# Patient Record
Sex: FEMALE | Race: ASIAN | NOT HISPANIC OR LATINO
[De-identification: names, ages, dates, MRNs, and addresses within clinical notes are randomized per-mention and may not be internally consistent; named-entity substitution may affect disease eponyms.]

---

## 2018-11-28 ENCOUNTER — TRANSCRIPTION ENCOUNTER (OUTPATIENT)
Age: 28
End: 2018-11-28

## 2018-11-28 ENCOUNTER — APPOINTMENT (OUTPATIENT)
Dept: INTERNAL MEDICINE | Facility: CLINIC | Age: 28
End: 2018-11-28
Payer: COMMERCIAL

## 2018-11-28 VITALS
TEMPERATURE: 98.4 F | OXYGEN SATURATION: 97 % | BODY MASS INDEX: 22.58 KG/M2 | HEART RATE: 72 BPM | HEIGHT: 59 IN | RESPIRATION RATE: 16 BRPM | DIASTOLIC BLOOD PRESSURE: 66 MMHG | SYSTOLIC BLOOD PRESSURE: 101 MMHG | WEIGHT: 112 LBS

## 2018-11-28 DIAGNOSIS — Z83.79 FAMILY HISTORY OF OTHER DISEASES OF THE DIGESTIVE SYSTEM: ICD-10-CM

## 2018-11-28 DIAGNOSIS — Z83.42 FAMILY HISTORY OF FAMILIAL HYPERCHOLESTEROLEMIA: ICD-10-CM

## 2018-11-28 DIAGNOSIS — Z84.89 FAMILY HISTORY OF OTHER SPECIFIED CONDITIONS: ICD-10-CM

## 2018-11-28 DIAGNOSIS — Z82.69 FAMILY HISTORY OF OTHER DISEASES OF THE MUSCULOSKELETAL SYSTEM AND CONNECTIVE TISSUE: ICD-10-CM

## 2018-11-28 DIAGNOSIS — Z83.3 FAMILY HISTORY OF DIABETES MELLITUS: ICD-10-CM

## 2018-11-28 DIAGNOSIS — Z78.9 OTHER SPECIFIED HEALTH STATUS: ICD-10-CM

## 2018-11-28 DIAGNOSIS — Z82.62 FAMILY HISTORY OF OSTEOPOROSIS: ICD-10-CM

## 2018-11-28 DIAGNOSIS — Z82.49 FAMILY HISTORY OF ISCHEMIC HEART DISEASE AND OTHER DISEASES OF THE CIRCULATORY SYSTEM: ICD-10-CM

## 2018-11-28 DIAGNOSIS — L98.9 DISORDER OF THE SKIN AND SUBCUTANEOUS TISSUE, UNSPECIFIED: ICD-10-CM

## 2018-11-28 DIAGNOSIS — B36.0 PITYRIASIS VERSICOLOR: ICD-10-CM

## 2018-11-28 DIAGNOSIS — Z82.3 FAMILY HISTORY OF STROKE: ICD-10-CM

## 2018-11-28 PROCEDURE — 99385 PREV VISIT NEW AGE 18-39: CPT

## 2018-11-28 PROCEDURE — 99203 OFFICE O/P NEW LOW 30 MIN: CPT | Mod: 25

## 2018-12-04 PROBLEM — B36.0 TINEA VERSICOLOR: Status: ACTIVE | Noted: 2018-12-04

## 2018-12-04 PROBLEM — L98.9 SKIN LESION: Noted: 2018-11-28

## 2018-12-04 NOTE — HISTORY OF PRESENT ILLNESS
[FreeTextEntry1] : ALISIA STONE is a 28 year-old female who presents to establish medical care.  [de-identified] : 28F with h/o tinea versicolor presents to establish care. She reports abnormal uterine bleeding 3 weeks ago, after changing her OCP.  Denies pelvic pain, dizziness, LH or prior episodes of AUB.  Reports being in good general health.  She reports compliance with taking her medications. Denies chest pain, SOB, weakness, palpitations, or syncope.   Requests refill for Ketoconazole 2% cream.

## 2018-12-04 NOTE — ASSESSMENT
[FreeTextEntry1] : 28 year-old woman with history of tinea versicolor, recent abnormal uterine bleeding presents for routine eam,  stable on the current regimen.  Continue follow-up with her Gynecologist for AUB.  Diet and lifestyle modifications as counseled.  Ketoconazole refilled.  The plan as ordered.

## 2018-12-04 NOTE — PHYSICAL EXAM

## 2018-12-04 NOTE — HEALTH RISK ASSESSMENT
[Good] : ~his/her~  mood as  good [No falls in past year] : Patient reported no falls in the past year [0] : 2) Feeling down, depressed, or hopeless: Not at all (0) [Patient reported PAP Smear was normal] : Patient reported PAP Smear was normal [HIV Test offered] : HIV Test offered [Hepatitis C test offered] : Hepatitis C test offered [With Family] : lives with family [Employed] : employed [] :  [Sexually Active] : sexually active [Fully functional (bathing, dressing, toileting, transferring, walking, feeding)] : Fully functional (bathing, dressing, toileting, transferring, walking, feeding) [Fully functional (using the telephone, shopping, preparing meals, housekeeping, doing laundry, using] : Fully functional and needs no help or supervision to perform IADLs (using the telephone, shopping, preparing meals, housekeeping, doing laundry, using transportation, managing medications and managing finances) [Discussed at today's visit] : Advance Directives Discussed at today's visit [Designated Healthcare Proxy] : Designated healthcare proxy [Name: ___] : Health Care Proxy's Name: [unfilled]  [] : No [[id number]] : 0 [Reports changes in hearing] : Reports no changes in hearing [Reports changes in vision] : Reports no changes in vision [Reports changes in dental health] : Reports no changes in dental health [PapSmearDate] : 04/17 [de-identified] :  [FreeTextEntry2] :

## 2018-12-09 LAB
25(OH)D3 SERPL-MCNC: 57.3 NG/ML
ALBUMIN SERPL ELPH-MCNC: 4.9 G/DL
ALP BLD-CCNC: 43 U/L
ALT SERPL-CCNC: 8 U/L
ANION GAP SERPL CALC-SCNC: 12 MMOL/L
APPEARANCE: ABNORMAL
AST SERPL-CCNC: 16 U/L
BACTERIA: ABNORMAL
BILIRUB SERPL-MCNC: 0.5 MG/DL
BILIRUBIN URINE: NEGATIVE
BLOOD URINE: ABNORMAL
BUN SERPL-MCNC: 11 MG/DL
CALCIUM SERPL-MCNC: 9.8 MG/DL
CHLORIDE SERPL-SCNC: 106 MMOL/L
CHOLEST SERPL-MCNC: 205 MG/DL
CHOLEST/HDLC SERPL: 3.3 RATIO
CO2 SERPL-SCNC: 23 MMOL/L
COLOR: YELLOW
CREAT SERPL-MCNC: 0.78 MG/DL
GLUCOSE QUALITATIVE U: NEGATIVE MG/DL
GLUCOSE SERPL-MCNC: 90 MG/DL
HBA1C MFR BLD HPLC: 5.2 %
HBV CORE IGG+IGM SER QL: NONREACTIVE
HBV SURFACE AB SER QL: REACTIVE
HBV SURFACE AG SER QL: NONREACTIVE
HCV AB SER QL: NONREACTIVE
HCV S/CO RATIO: 0.15 S/CO
HDLC SERPL-MCNC: 63 MG/DL
HIV1+2 AB SPEC QL IA.RAPID: NONREACTIVE
HYALINE CASTS: 11 /LPF
KETONES URINE: NEGATIVE
LDLC SERPL CALC-MCNC: 130 MG/DL
LEUKOCYTE ESTERASE URINE: ABNORMAL
MICROSCOPIC-UA: NORMAL
NITRITE URINE: NEGATIVE
PH URINE: 6
POTASSIUM SERPL-SCNC: 4.7 MMOL/L
PROT SERPL-MCNC: 8.1 G/DL
PROTEIN URINE: NEGATIVE MG/DL
RED BLOOD CELLS URINE: 18 /HPF
SODIUM SERPL-SCNC: 141 MMOL/L
SPECIFIC GRAVITY URINE: 1.03
SQUAMOUS EPITHELIAL CELLS: 17 /HPF
TRIGL SERPL-MCNC: 58 MG/DL
UROBILINOGEN URINE: NEGATIVE MG/DL
WHITE BLOOD CELLS URINE: 43 /HPF

## 2019-07-16 ENCOUNTER — TRANSCRIPTION ENCOUNTER (OUTPATIENT)
Age: 29
End: 2019-07-16

## 2019-11-08 ENCOUNTER — APPOINTMENT (OUTPATIENT)
Dept: INTERNAL MEDICINE | Facility: CLINIC | Age: 29
End: 2019-11-08
Payer: COMMERCIAL

## 2019-11-08 VITALS
SYSTOLIC BLOOD PRESSURE: 108 MMHG | WEIGHT: 114 LBS | DIASTOLIC BLOOD PRESSURE: 71 MMHG | HEIGHT: 59 IN | TEMPERATURE: 98.7 F | HEART RATE: 85 BPM | RESPIRATION RATE: 16 BRPM | OXYGEN SATURATION: 97 % | BODY MASS INDEX: 22.98 KG/M2

## 2019-11-08 DIAGNOSIS — Z80.6 FAMILY HISTORY OF LEUKEMIA: ICD-10-CM

## 2019-11-08 DIAGNOSIS — Z00.00 ENCOUNTER FOR GENERAL ADULT MEDICAL EXAMINATION W/OUT ABNORMAL FINDINGS: ICD-10-CM

## 2019-11-08 DIAGNOSIS — N93.9 ABNORMAL UTERINE AND VAGINAL BLEEDING, UNSPECIFIED: ICD-10-CM

## 2019-11-08 LAB
BASOPHILS # BLD AUTO: 0.02 K/UL
BASOPHILS NFR BLD AUTO: 0.3 %
EOSINOPHIL # BLD AUTO: 0.04 K/UL
EOSINOPHIL NFR BLD AUTO: 0.7 %
HCT VFR BLD CALC: 41 %
HGB BLD-MCNC: 13.4 G/DL
IMM GRANULOCYTES NFR BLD AUTO: 0 %
LYMPHOCYTES # BLD AUTO: 2.23 K/UL
LYMPHOCYTES NFR BLD AUTO: 37.2 %
MAN DIFF?: NORMAL
MCHC RBC-ENTMCNC: 30.1 PG
MCHC RBC-ENTMCNC: 32.7 GM/DL
MCV RBC AUTO: 92.1 FL
MONOCYTES # BLD AUTO: 0.4 K/UL
MONOCYTES NFR BLD AUTO: 6.7 %
NEUTROPHILS # BLD AUTO: 3.3 K/UL
NEUTROPHILS NFR BLD AUTO: 55.1 %
PLATELET # BLD AUTO: 354 K/UL
RBC # BLD: 4.45 M/UL
RBC # FLD: 12.9 %
WBC # FLD AUTO: 5.99 K/UL

## 2019-11-08 PROCEDURE — 99385 PREV VISIT NEW AGE 18-39: CPT

## 2019-11-08 NOTE — PHYSICAL EXAM
[No Acute Distress] : no acute distress [Well Nourished] : well nourished [Well Developed] : well developed [Normal Sclera/Conjunctiva] : normal sclera/conjunctiva [Well-Appearing] : well-appearing [Normal Outer Ear/Nose] : the outer ears and nose were normal in appearance [PERRL] : pupils equal round and reactive to light [EOMI] : extraocular movements intact [Normal Oropharynx] : the oropharynx was normal [No JVD] : no jugular venous distention [Supple] : supple [Thyroid Normal, No Nodules] : the thyroid was normal and there were no nodules present [No Lymphadenopathy] : no lymphadenopathy [No Respiratory Distress] : no respiratory distress  [Normal Rate] : normal rate  [No Accessory Muscle Use] : no accessory muscle use [Clear to Auscultation] : lungs were clear to auscultation bilaterally [No Murmur] : no murmur heard [Normal S1, S2] : normal S1 and S2 [Regular Rhythm] : with a regular rhythm [No Carotid Bruits] : no carotid bruits [No Abdominal Bruit] : a ~M bruit was not heard ~T in the abdomen [No Varicosities] : no varicosities [No Edema] : there was no peripheral edema [No Palpable Aorta] : no palpable aorta [Pedal Pulses Present] : the pedal pulses are present [No Extremity Clubbing/Cyanosis] : no extremity clubbing/cyanosis [Normal Appearance] : normal in appearance [No Axillary Lymphadenopathy] : no axillary lymphadenopathy [No Nipple Discharge] : no nipple discharge [Soft] : abdomen soft [Non Tender] : non-tender [Non-distended] : non-distended [Normal Bowel Sounds] : normal bowel sounds [No Masses] : no abdominal mass palpated [No HSM] : no HSM [Normal Posterior Cervical Nodes] : no posterior cervical lymphadenopathy [Normal Supraclavicular Nodes] : no supraclavicular lymphadenopathy [Normal Axillary Nodes] : no axillary lymphadenopathy [No CVA Tenderness] : no CVA  tenderness [Normal Anterior Cervical Nodes] : no anterior cervical lymphadenopathy [No Spinal Tenderness] : no spinal tenderness [No Joint Swelling] : no joint swelling [No Rash] : no rash [Grossly Normal Strength/Tone] : grossly normal strength/tone [Coordination Grossly Intact] : coordination grossly intact [No Focal Deficits] : no focal deficits [Normal Gait] : normal gait [Normal Insight/Judgement] : insight and judgment were intact [Normal Affect] : the affect was normal

## 2019-11-08 NOTE — HISTORY OF PRESENT ILLNESS
[FreeTextEntry1] : establish care [de-identified] : 28yo F,  with no children, PMH abnormal uterine bleeding, tinea versicolor presents to establish care\par menses regular on OCP -> planning to discontinue OCP in 2020\par endorses cramping with menses\par father  3 weeks ago from lymphoblastic leukemia\par no other complaints

## 2019-11-08 NOTE — HEALTH RISK ASSESSMENT
[Patient reported PAP Smear was normal] : Patient reported PAP Smear was normal [Fully functional (bathing, dressing, toileting, transferring, walking, feeding)] : Fully functional (bathing, dressing, toileting, transferring, walking, feeding) [Reports changes in hearing] : Reports no changes in hearing [Fully functional (using the telephone, shopping, preparing meals, housekeeping, doing laundry, using] : Fully functional and needs no help or supervision to perform IADLs (using the telephone, shopping, preparing meals, housekeeping, doing laundry, using transportation, managing medications and managing finances) [Reports changes in dental health] : Reports no changes in dental health [Reports changes in vision] : Reports no changes in vision [PapSmearDate] : 06/18

## 2019-11-12 LAB
ALBUMIN SERPL ELPH-MCNC: 4.6 G/DL
ALP BLD-CCNC: 46 U/L
ALT SERPL-CCNC: 6 U/L
ANION GAP SERPL CALC-SCNC: 13 MMOL/L
AST SERPL-CCNC: 14 U/L
BASOPHILS # BLD AUTO: 0.02 K/UL
BASOPHILS NFR BLD AUTO: 0.4 %
BILIRUB SERPL-MCNC: 0.4 MG/DL
BUN SERPL-MCNC: 10 MG/DL
CALCIUM SERPL-MCNC: 9.9 MG/DL
CHLORIDE SERPL-SCNC: 104 MMOL/L
CHOLEST SERPL-MCNC: 208 MG/DL
CHOLEST/HDLC SERPL: 3.8 RATIO
CO2 SERPL-SCNC: 24 MMOL/L
CREAT SERPL-MCNC: 0.73 MG/DL
EOSINOPHIL # BLD AUTO: 0.06 K/UL
EOSINOPHIL NFR BLD AUTO: 1.1 %
ESTIMATED AVERAGE GLUCOSE: 103 MG/DL
GLUCOSE SERPL-MCNC: 89 MG/DL
HBA1C MFR BLD HPLC: 5.2 %
HCT VFR BLD CALC: 41 %
HDLC SERPL-MCNC: 55 MG/DL
HGB BLD-MCNC: 13.4 G/DL
IMM GRANULOCYTES NFR BLD AUTO: 0.2 %
LDLC SERPL CALC-MCNC: 138 MG/DL
LYMPHOCYTES # BLD AUTO: 1.84 K/UL
LYMPHOCYTES NFR BLD AUTO: 33.9 %
MAN DIFF?: NORMAL
MCHC RBC-ENTMCNC: 30.3 PG
MCHC RBC-ENTMCNC: 32.7 GM/DL
MCV RBC AUTO: 92.8 FL
MONOCYTES # BLD AUTO: 0.47 K/UL
MONOCYTES NFR BLD AUTO: 8.7 %
NEUTROPHILS # BLD AUTO: 3.02 K/UL
NEUTROPHILS NFR BLD AUTO: 55.7 %
PLATELET # BLD AUTO: 329 K/UL
POTASSIUM SERPL-SCNC: 4.4 MMOL/L
PROT SERPL-MCNC: 7.3 G/DL
RBC # BLD: 4.42 M/UL
RBC # FLD: 12 %
SODIUM SERPL-SCNC: 141 MMOL/L
TRIGL SERPL-MCNC: 74 MG/DL
TSH SERPL-ACNC: 1.96 UIU/ML
WBC # FLD AUTO: 5.42 K/UL

## 2020-01-08 ENCOUNTER — APPOINTMENT (OUTPATIENT)
Dept: INFECTIOUS DISEASE | Facility: CLINIC | Age: 30
End: 2020-01-08

## 2020-01-10 ENCOUNTER — APPOINTMENT (OUTPATIENT)
Dept: INTERNAL MEDICINE | Facility: CLINIC | Age: 30
End: 2020-01-10
Payer: COMMERCIAL

## 2020-01-10 VITALS
RESPIRATION RATE: 16 BRPM | HEIGHT: 59 IN | TEMPERATURE: 98.7 F | DIASTOLIC BLOOD PRESSURE: 70 MMHG | BODY MASS INDEX: 23.18 KG/M2 | OXYGEN SATURATION: 98 % | HEART RATE: 91 BPM | WEIGHT: 115 LBS | SYSTOLIC BLOOD PRESSURE: 107 MMHG

## 2020-01-10 DIAGNOSIS — Z23 ENCOUNTER FOR IMMUNIZATION: ICD-10-CM

## 2020-01-10 DIAGNOSIS — Z71.84 ENC FOR HEALTH COUNSELING RELATED TO TRAVEL: ICD-10-CM

## 2020-01-10 DIAGNOSIS — K21.9 GASTRO-ESOPHAGEAL REFLUX DISEASE W/OUT ESOPHAGITIS: ICD-10-CM

## 2020-01-10 DIAGNOSIS — L70.9 ACNE, UNSPECIFIED: ICD-10-CM

## 2020-01-10 PROCEDURE — 90690 TYPHOID VACCINE ORAL: CPT

## 2020-01-10 PROCEDURE — 99213 OFFICE O/P EST LOW 20 MIN: CPT | Mod: 25

## 2020-01-10 PROCEDURE — 90471 IMMUNIZATION ADMIN: CPT

## 2020-01-10 PROCEDURE — 90472 IMMUNIZATION ADMIN EACH ADD: CPT | Mod: 59

## 2020-01-10 PROCEDURE — 90632 HEPA VACCINE ADULT IM: CPT

## 2020-01-10 RX ORDER — LEVONORGESTREL AND ETHINYL ESTRADIOL 0.15-0.03
0.15-3 KIT ORAL
Refills: 0 | Status: DISCONTINUED | COMMUNITY
End: 2020-01-10

## 2020-01-10 RX ORDER — CLINDAMYCIN PHOSPHATE 10 MG/ML
1 LOTION TOPICAL DAILY
Qty: 1 | Refills: 0 | Status: ACTIVE | COMMUNITY
Start: 2020-01-10 | End: 1900-01-01

## 2020-01-10 NOTE — HISTORY OF PRESENT ILLNESS
[FreeTextEntry1] : hep a vaccine prior to travel [de-identified] : 28yo F PMH abnormal uterine bleeding, tinea versicolor presents requesting Hep A and typhoid vaccines prior to 3/2019 trip to [place]\par will dc OCP soon as trying to become pregnant -> asking if anything beside NSAID for menstrual cramps\par menses regular on OCP -> planning to discontinue OCP in 1/2020\par using OTC benzoyl peroxide for facial acne with no improvement\par endorses occ heartburn

## 2020-03-22 DIAGNOSIS — M54.2 CERVICALGIA: ICD-10-CM

## 2020-04-27 DIAGNOSIS — L03.011 CELLULITIS OF RIGHT FINGER: ICD-10-CM

## 2020-04-27 RX ORDER — SALMONELLA TYPHI TY21A 6000000000 [CFU]/1
CAPSULE, COATED ORAL
Qty: 1 | Refills: 0 | Status: DISCONTINUED | COMMUNITY
Start: 2020-01-10 | End: 2020-04-27

## 2020-04-27 RX ORDER — AMOXICILLIN 500 MG/1
500 TABLET, FILM COATED ORAL 3 TIMES DAILY
Qty: 21 | Refills: 0 | Status: ACTIVE | COMMUNITY
Start: 2020-04-27 | End: 1900-01-01

## 2020-04-27 RX ORDER — CYCLOBENZAPRINE HYDROCHLORIDE 5 MG/1
5 TABLET, FILM COATED ORAL
Qty: 60 | Refills: 1 | Status: DISCONTINUED | COMMUNITY
Start: 2020-03-22 | End: 2020-04-27

## 2020-06-04 ENCOUNTER — RESULT REVIEW (OUTPATIENT)
Age: 30
End: 2020-06-04

## 2020-07-09 ENCOUNTER — OUTPATIENT (OUTPATIENT)
Dept: OUTPATIENT SERVICES | Facility: HOSPITAL | Age: 30
LOS: 1 days | End: 2020-07-09
Payer: COMMERCIAL

## 2020-07-09 ENCOUNTER — TRANSCRIPTION ENCOUNTER (OUTPATIENT)
Age: 30
End: 2020-07-09

## 2020-07-09 VITALS
HEART RATE: 83 BPM | OXYGEN SATURATION: 98 % | HEIGHT: 59 IN | DIASTOLIC BLOOD PRESSURE: 70 MMHG | TEMPERATURE: 98 F | SYSTOLIC BLOOD PRESSURE: 102 MMHG | RESPIRATION RATE: 15 BRPM | WEIGHT: 111.99 LBS

## 2020-07-09 DIAGNOSIS — Z11.59 ENCOUNTER FOR SCREENING FOR OTHER VIRAL DISEASES: ICD-10-CM

## 2020-07-09 LAB — SARS-COV-2 RNA SPEC QL NAA+PROBE: SIGNIFICANT CHANGE UP

## 2020-07-09 PROCEDURE — 87635 SARS-COV-2 COVID-19 AMP PRB: CPT

## 2020-07-09 RX ORDER — LIDOCAINE HCL 20 MG/ML
0.2 VIAL (ML) INJECTION ONCE
Refills: 0 | Status: DISCONTINUED | OUTPATIENT
Start: 2020-07-10 | End: 2020-07-25

## 2020-07-09 RX ORDER — SODIUM CHLORIDE 9 MG/ML
3 INJECTION INTRAMUSCULAR; INTRAVENOUS; SUBCUTANEOUS EVERY 8 HOURS
Refills: 0 | Status: DISCONTINUED | OUTPATIENT
Start: 2020-07-10 | End: 2020-07-25

## 2020-07-09 NOTE — H&P PST ADULT - ATTENDING COMMENTS
30 yo  at 12w by dates and found on sono yest to have fetal demise size 8w6d.  Pros/cons observation vs D&C reviewed and pt elected D&C.  Type Bpos.

## 2020-07-09 NOTE — H&P PST ADULT - NSICDXPASTMEDICALHX_GEN_ALL_CORE_FT
PAST MEDICAL HISTORY:  GERD (gastroesophageal reflux disease) PAST MEDICAL HISTORY:  GERD (gastroesophageal reflux disease)     Missed

## 2020-07-09 NOTE — H&P PST ADULT - HISTORY OF PRESENT ILLNESS
29 yr old female  , 7 weeks + 5 days pregnant , with missed , Coming in for Suction D & C for Missed  on 7/10/2020. 29 yr old female  , 7 weeks + 5 days pregnant , with missed , Coming in for Suction D & C for Missed  on 7/10/2020.    History  obtained via teleconference 2020  Covid test negative 2020 29 yr old female  , 7 weeks + 5 days pregnant , with missed , Coming in for Suction D & C for Missed  on 7/10/2020.    History  obtained via teleconference 2020  Covid test negative 2020     review of labs COVID negative denies travel, s/s known exposure

## 2020-07-10 ENCOUNTER — RESULT REVIEW (OUTPATIENT)
Age: 30
End: 2020-07-10

## 2020-07-10 ENCOUNTER — OUTPATIENT (OUTPATIENT)
Dept: OUTPATIENT SERVICES | Facility: HOSPITAL | Age: 30
LOS: 1 days | End: 2020-07-10
Payer: COMMERCIAL

## 2020-07-10 VITALS
DIASTOLIC BLOOD PRESSURE: 66 MMHG | OXYGEN SATURATION: 99 % | SYSTOLIC BLOOD PRESSURE: 120 MMHG | TEMPERATURE: 97 F | RESPIRATION RATE: 16 BRPM | HEART RATE: 88 BPM

## 2020-07-10 DIAGNOSIS — O02.1 MISSED ABORTION: ICD-10-CM

## 2020-07-10 DIAGNOSIS — K08.409 PARTIAL LOSS OF TEETH, UNSPECIFIED CAUSE, UNSPECIFIED CLASS: Chronic | ICD-10-CM

## 2020-07-10 LAB
BASOPHILS # BLD AUTO: 0.03 K/UL — SIGNIFICANT CHANGE UP (ref 0–0.2)
BASOPHILS NFR BLD AUTO: 0.4 % — SIGNIFICANT CHANGE UP (ref 0–2)
BLD GP AB SCN SERPL QL: NEGATIVE — SIGNIFICANT CHANGE UP
EOSINOPHIL # BLD AUTO: 0.1 K/UL — SIGNIFICANT CHANGE UP (ref 0–0.5)
EOSINOPHIL NFR BLD AUTO: 1.2 % — SIGNIFICANT CHANGE UP (ref 0–6)
HCT VFR BLD CALC: 39 % — SIGNIFICANT CHANGE UP (ref 34.5–45)
HGB BLD-MCNC: 13 G/DL — SIGNIFICANT CHANGE UP (ref 11.5–15.5)
IMM GRANULOCYTES NFR BLD AUTO: 0.2 % — SIGNIFICANT CHANGE UP (ref 0–1.5)
LYMPHOCYTES # BLD AUTO: 1.98 K/UL — SIGNIFICANT CHANGE UP (ref 1–3.3)
LYMPHOCYTES # BLD AUTO: 24.1 % — SIGNIFICANT CHANGE UP (ref 13–44)
MCHC RBC-ENTMCNC: 31 PG — SIGNIFICANT CHANGE UP (ref 27–34)
MCHC RBC-ENTMCNC: 33.3 GM/DL — SIGNIFICANT CHANGE UP (ref 32–36)
MCV RBC AUTO: 92.9 FL — SIGNIFICANT CHANGE UP (ref 80–100)
MONOCYTES # BLD AUTO: 0.59 K/UL — SIGNIFICANT CHANGE UP (ref 0–0.9)
MONOCYTES NFR BLD AUTO: 7.2 % — SIGNIFICANT CHANGE UP (ref 2–14)
NEUTROPHILS # BLD AUTO: 5.5 K/UL — SIGNIFICANT CHANGE UP (ref 1.8–7.4)
NEUTROPHILS NFR BLD AUTO: 66.9 % — SIGNIFICANT CHANGE UP (ref 43–77)
NRBC # BLD: 0 /100 WBCS — SIGNIFICANT CHANGE UP (ref 0–0)
PLATELET # BLD AUTO: 325 K/UL — SIGNIFICANT CHANGE UP (ref 150–400)
RBC # BLD: 4.2 M/UL — SIGNIFICANT CHANGE UP (ref 3.8–5.2)
RBC # FLD: 12.4 % — SIGNIFICANT CHANGE UP (ref 10.3–14.5)
RH IG SCN BLD-IMP: POSITIVE — SIGNIFICANT CHANGE UP
WBC # BLD: 8.22 K/UL — SIGNIFICANT CHANGE UP (ref 3.8–10.5)
WBC # FLD AUTO: 8.22 K/UL — SIGNIFICANT CHANGE UP (ref 3.8–10.5)

## 2020-07-10 PROCEDURE — 86901 BLOOD TYPING SEROLOGIC RH(D): CPT

## 2020-07-10 PROCEDURE — 88264 CHROMOSOME ANALYSIS 20-25: CPT

## 2020-07-10 PROCEDURE — 88305 TISSUE EXAM BY PATHOLOGIST: CPT | Mod: 26

## 2020-07-10 PROCEDURE — 88233 TISSUE CULTURE SKIN/BIOPSY: CPT

## 2020-07-10 PROCEDURE — 88280 CHROMOSOME KARYOTYPE STUDY: CPT

## 2020-07-10 PROCEDURE — 86850 RBC ANTIBODY SCREEN: CPT

## 2020-07-10 PROCEDURE — 85027 COMPLETE CBC AUTOMATED: CPT

## 2020-07-10 PROCEDURE — 86900 BLOOD TYPING SEROLOGIC ABO: CPT

## 2020-07-10 PROCEDURE — 59820 CARE OF MISCARRIAGE: CPT

## 2020-07-10 PROCEDURE — 88305 TISSUE EXAM BY PATHOLOGIST: CPT

## 2020-07-10 RX ORDER — CELECOXIB 200 MG/1
200 CAPSULE ORAL ONCE
Refills: 0 | Status: DISCONTINUED | OUTPATIENT
Start: 2020-07-10 | End: 2020-07-25

## 2020-07-10 RX ORDER — OXYCODONE HYDROCHLORIDE 5 MG/1
5 TABLET ORAL ONCE
Refills: 0 | Status: DISCONTINUED | OUTPATIENT
Start: 2020-07-10 | End: 2020-07-10

## 2020-07-10 RX ORDER — ONDANSETRON 8 MG/1
4 TABLET, FILM COATED ORAL ONCE
Refills: 0 | Status: DISCONTINUED | OUTPATIENT
Start: 2020-07-10 | End: 2020-07-25

## 2020-07-10 NOTE — ASU DISCHARGE PLAN (ADULT/PEDIATRIC) - CARE PROVIDER_API CALL
Westley Giordano  OBSTETRICS AND GYNECOLOGY  [address] [place], [place] [address]  Phone: [phone]  Fax: [phone]  Follow Up Time:

## 2020-07-10 NOTE — ASU DISCHARGE PLAN (ADULT/PEDIATRIC) - CALL YOUR DOCTOR IF YOU HAVE ANY OF THE FOLLOWING:
Fever greater than (need to indicate Fahrenheit or Celsius)/Inability to tolerate liquids or foods/Nausea and vomiting that does not stop/Bleeding that does not stop/Pain not relieved by Medications

## 2020-07-10 NOTE — PACU DISCHARGE NOTE - MENTAL STATUS: PATIENT PARTICIPATION
Awake
How Severe Is Your Skin Lesion?: mild
Has Your Skin Lesion Been Treated?: not been treated
Is This A New Presentation, Or A Follow-Up?: Skin Lesion

## 2020-07-10 NOTE — BRIEF OPERATIVE NOTE - NSICDXBRIEFPROCEDURE_GEN_ALL_CORE_FT
PROCEDURES:  Dilation and evacuation of uterus using suction curettage 10-Jul-2020 12:05:54  Mikayla Montoya

## 2020-07-14 LAB — SURGICAL PATHOLOGY STUDY: SIGNIFICANT CHANGE UP

## 2020-07-27 LAB — CHROM ANALY OVERALL INTERP SPEC-IMP: SIGNIFICANT CHANGE UP

## 2020-09-10 PROBLEM — K21.9 GASTRO-ESOPHAGEAL REFLUX DISEASE WITHOUT ESOPHAGITIS: Chronic | Status: ACTIVE | Noted: 2020-07-09

## 2020-09-10 PROBLEM — O02.1 MISSED ABORTION: Chronic | Status: ACTIVE | Noted: 2020-07-10

## 2021-07-01 ENCOUNTER — APPOINTMENT (OUTPATIENT)
Dept: ANTEPARTUM | Facility: CLINIC | Age: 31
End: 2021-07-01

## 2021-07-01 ENCOUNTER — ASOB RESULT (OUTPATIENT)
Age: 31
End: 2021-07-01

## 2021-07-01 ENCOUNTER — OUTPATIENT (OUTPATIENT)
Dept: OUTPATIENT SERVICES | Facility: HOSPITAL | Age: 31
LOS: 1 days | End: 2021-07-01
Payer: COMMERCIAL

## 2021-07-01 DIAGNOSIS — K08.409 PARTIAL LOSS OF TEETH, UNSPECIFIED CAUSE, UNSPECIFIED CLASS: Chronic | ICD-10-CM

## 2021-07-01 DIAGNOSIS — Z11.52 ENCOUNTER FOR SCREENING FOR COVID-19: ICD-10-CM

## 2021-07-01 LAB — SARS-COV-2 RNA SPEC QL NAA+PROBE: SIGNIFICANT CHANGE UP

## 2021-07-01 PROCEDURE — C9803: CPT

## 2021-07-01 PROCEDURE — U0005: CPT

## 2021-07-01 PROCEDURE — U0003: CPT

## 2021-07-02 ENCOUNTER — OUTPATIENT (OUTPATIENT)
Dept: OUTPATIENT SERVICES | Facility: HOSPITAL | Age: 31
LOS: 1 days | End: 2021-07-02
Payer: COMMERCIAL

## 2021-07-02 VITALS — SYSTOLIC BLOOD PRESSURE: 109 MMHG | HEART RATE: 108 BPM | DIASTOLIC BLOOD PRESSURE: 59 MMHG

## 2021-07-02 VITALS
OXYGEN SATURATION: 98 % | RESPIRATION RATE: 14 BRPM | TEMPERATURE: 99 F | SYSTOLIC BLOOD PRESSURE: 110 MMHG | DIASTOLIC BLOOD PRESSURE: 67 MMHG | HEART RATE: 107 BPM

## 2021-07-02 DIAGNOSIS — Z3A.00 WEEKS OF GESTATION OF PREGNANCY NOT SPECIFIED: ICD-10-CM

## 2021-07-02 DIAGNOSIS — O26.899 OTHER SPECIFIED PREGNANCY RELATED CONDITIONS, UNSPECIFIED TRIMESTER: ICD-10-CM

## 2021-07-02 DIAGNOSIS — K08.409 PARTIAL LOSS OF TEETH, UNSPECIFIED CAUSE, UNSPECIFIED CLASS: Chronic | ICD-10-CM

## 2021-07-02 LAB
BLD GP AB SCN SERPL QL: NEGATIVE — SIGNIFICANT CHANGE UP
HCT VFR BLD CALC: 35.4 % — SIGNIFICANT CHANGE UP (ref 34.5–45)
HGB BLD-MCNC: 11.6 G/DL — SIGNIFICANT CHANGE UP (ref 11.5–15.5)
MCHC RBC-ENTMCNC: 30.2 PG — SIGNIFICANT CHANGE UP (ref 27–34)
MCHC RBC-ENTMCNC: 32.8 GM/DL — SIGNIFICANT CHANGE UP (ref 32–36)
MCV RBC AUTO: 92.2 FL — SIGNIFICANT CHANGE UP (ref 80–100)
NRBC # BLD: 0 /100 WBCS — SIGNIFICANT CHANGE UP (ref 0–0)
PLATELET # BLD AUTO: 365 K/UL — SIGNIFICANT CHANGE UP (ref 150–400)
RBC # BLD: 3.84 M/UL — SIGNIFICANT CHANGE UP (ref 3.8–5.2)
RBC # FLD: 13.8 % — SIGNIFICANT CHANGE UP (ref 10.3–14.5)
RH IG SCN BLD-IMP: POSITIVE — SIGNIFICANT CHANGE UP
WBC # BLD: 10.06 K/UL — SIGNIFICANT CHANGE UP (ref 3.8–10.5)
WBC # FLD AUTO: 10.06 K/UL — SIGNIFICANT CHANGE UP (ref 3.8–10.5)

## 2021-07-02 PROCEDURE — 86850 RBC ANTIBODY SCREEN: CPT

## 2021-07-02 PROCEDURE — 86900 BLOOD TYPING SEROLOGIC ABO: CPT

## 2021-07-02 PROCEDURE — 59025 FETAL NON-STRESS TEST: CPT

## 2021-07-02 PROCEDURE — 85027 COMPLETE CBC AUTOMATED: CPT

## 2021-07-02 PROCEDURE — 86901 BLOOD TYPING SEROLOGIC RH(D): CPT

## 2021-07-02 PROCEDURE — G0463: CPT

## 2021-07-02 RX ORDER — SODIUM CHLORIDE 9 MG/ML
3 INJECTION INTRAMUSCULAR; INTRAVENOUS; SUBCUTANEOUS EVERY 8 HOURS
Refills: 0 | Status: DISCONTINUED | OUTPATIENT
Start: 2021-07-02 | End: 2021-07-16

## 2021-07-02 RX ADMIN — Medication 0.25 MILLIGRAM(S): at 11:48

## 2021-07-02 NOTE — OB PROVIDER TRIAGE NOTE - NSOBPROVIDERNOTE_OBGYN_ALL_OB_FT
29yo  @37w1d p.f attempted ECV  - for ECV  - CBC and T&S  - terbutalinex1    dw Dr.Rosenberg Dalton Medina PGY3 31yo  @37w1d p.f attempted ECV  - for ECV  - CBC and T&S  - terbutalinex1    dw Dr.Rosenberg Dalton Medina PGY3      Version was attempted and was unsuccesful  - Plan for C/S scheduling  - 1hr NST afterwards reactive  - Rh+    dw Dr.Chu Dalton Medina PGY3

## 2021-07-02 NOTE — OB PROVIDER TRIAGE NOTE - HISTORY OF PRESENT ILLNESS
31yo  @37w1d  p/f attempt ECV. –VB, -LOF, -Ctx, +FM. denies fever, chills, nausea, vomiting, diarrhea, headache, constipation, dizziness, syncope, chest pain, palpitations, shortness of breath, dysuria, urgency, frequency.  PNC: unc  GBS: unk  ObHx: MABx1 s/p D&C  GynHx: denies  MedHx: denies  SrgHx: denies  PsychHx: denies  SocialHx: denies  AllergyHx: denies  RxHx: PNV

## 2021-07-02 NOTE — OB PROVIDER TRIAGE NOTE - NSHPPHYSICALEXAM_GEN_ALL_CORE
ICU Vital Signs Last 24 Hrs  T(C): 37.1 (02 Jul 2021 10:33), Max: 37.1 (02 Jul 2021 10:33)  T(F): 98.8 (02 Jul 2021 10:33), Max: 98.8 (02 Jul 2021 10:33)  HR: 105 (02 Jul 2021 11:19) (98 - 107)  BP: 110/67 (02 Jul 2021 10:43) (110/67 - 110/67)  RR: 14 (02 Jul 2021 10:33) (14 - 14)  SpO2: 97% (02 Jul 2021 11:19) (96% - 99%)    CV:RRR  Pulm: CTA bl  VE: deferred  FHT:  145   , mod aquilino, + accels, - decels ALLEN  TOCO:  none  BSUS: praneeth breech, left posterior placenta, back up maternal right, AQUILES 10.97, BPP 8/8

## 2021-07-09 ENCOUNTER — OUTPATIENT (OUTPATIENT)
Dept: OUTPATIENT SERVICES | Facility: HOSPITAL | Age: 31
LOS: 1 days | End: 2021-07-09
Payer: COMMERCIAL

## 2021-07-09 VITALS
DIASTOLIC BLOOD PRESSURE: 77 MMHG | HEART RATE: 100 BPM | RESPIRATION RATE: 20 BRPM | HEIGHT: 59 IN | WEIGHT: 130.07 LBS | TEMPERATURE: 98 F | OXYGEN SATURATION: 96 % | SYSTOLIC BLOOD PRESSURE: 113 MMHG

## 2021-07-09 DIAGNOSIS — Z34.90 ENCOUNTER FOR SUPERVISION OF NORMAL PREGNANCY, UNSPECIFIED, UNSPECIFIED TRIMESTER: ICD-10-CM

## 2021-07-09 DIAGNOSIS — Z01.818 ENCOUNTER FOR OTHER PREPROCEDURAL EXAMINATION: ICD-10-CM

## 2021-07-09 DIAGNOSIS — Z98.890 OTHER SPECIFIED POSTPROCEDURAL STATES: Chronic | ICD-10-CM

## 2021-07-09 DIAGNOSIS — K08.409 PARTIAL LOSS OF TEETH, UNSPECIFIED CAUSE, UNSPECIFIED CLASS: Chronic | ICD-10-CM

## 2021-07-09 DIAGNOSIS — Z29.9 ENCOUNTER FOR PROPHYLACTIC MEASURES, UNSPECIFIED: ICD-10-CM

## 2021-07-09 LAB
ANION GAP SERPL CALC-SCNC: 12 MMOL/L — SIGNIFICANT CHANGE UP (ref 5–17)
BLD GP AB SCN SERPL QL: POSITIVE — SIGNIFICANT CHANGE UP
BUN SERPL-MCNC: 7 MG/DL — SIGNIFICANT CHANGE UP (ref 7–23)
CALCIUM SERPL-MCNC: 9.7 MG/DL — SIGNIFICANT CHANGE UP (ref 8.4–10.5)
CHLORIDE SERPL-SCNC: 101 MMOL/L — SIGNIFICANT CHANGE UP (ref 96–108)
CO2 SERPL-SCNC: 19 MMOL/L — LOW (ref 22–31)
CREAT SERPL-MCNC: 0.43 MG/DL — LOW (ref 0.5–1.3)
GLUCOSE SERPL-MCNC: 85 MG/DL — SIGNIFICANT CHANGE UP (ref 70–99)
HCT VFR BLD CALC: 36.6 % — SIGNIFICANT CHANGE UP (ref 34.5–45)
HGB BLD-MCNC: 12 G/DL — SIGNIFICANT CHANGE UP (ref 11.5–15.5)
MCHC RBC-ENTMCNC: 30.5 PG — SIGNIFICANT CHANGE UP (ref 27–34)
MCHC RBC-ENTMCNC: 32.8 GM/DL — SIGNIFICANT CHANGE UP (ref 32–36)
MCV RBC AUTO: 93.1 FL — SIGNIFICANT CHANGE UP (ref 80–100)
NRBC # BLD: 0 /100 WBCS — SIGNIFICANT CHANGE UP (ref 0–0)
PLATELET # BLD AUTO: 378 K/UL — SIGNIFICANT CHANGE UP (ref 150–400)
POTASSIUM SERPL-MCNC: 3.9 MMOL/L — SIGNIFICANT CHANGE UP (ref 3.5–5.3)
POTASSIUM SERPL-SCNC: 3.9 MMOL/L — SIGNIFICANT CHANGE UP (ref 3.5–5.3)
RBC # BLD: 3.93 M/UL — SIGNIFICANT CHANGE UP (ref 3.8–5.2)
RBC # FLD: 13.9 % — SIGNIFICANT CHANGE UP (ref 10.3–14.5)
RH IG SCN BLD-IMP: POSITIVE — SIGNIFICANT CHANGE UP
SODIUM SERPL-SCNC: 132 MMOL/L — LOW (ref 135–145)
WBC # BLD: 9.66 K/UL — SIGNIFICANT CHANGE UP (ref 3.8–10.5)
WBC # FLD AUTO: 9.66 K/UL — SIGNIFICANT CHANGE UP (ref 3.8–10.5)

## 2021-07-09 PROCEDURE — G0463: CPT

## 2021-07-09 PROCEDURE — 80048 BASIC METABOLIC PNL TOTAL CA: CPT

## 2021-07-09 PROCEDURE — 86900 BLOOD TYPING SEROLOGIC ABO: CPT

## 2021-07-09 PROCEDURE — 86850 RBC ANTIBODY SCREEN: CPT

## 2021-07-09 PROCEDURE — 86905 BLOOD TYPING RBC ANTIGENS: CPT

## 2021-07-09 PROCEDURE — 86901 BLOOD TYPING SEROLOGIC RH(D): CPT

## 2021-07-09 PROCEDURE — 86077 PHYS BLOOD BANK SERV XMATCH: CPT

## 2021-07-09 PROCEDURE — 86880 COOMBS TEST DIRECT: CPT

## 2021-07-09 PROCEDURE — 86870 RBC ANTIBODY IDENTIFICATION: CPT

## 2021-07-09 PROCEDURE — 85027 COMPLETE CBC AUTOMATED: CPT

## 2021-07-09 NOTE — OB PST NOTE - ASSESSMENT
JENAI VTE 2.0 SCORE [CLOT updated 2019]    AGE RELATED RISK FACTORS                                                       MOBILITY RELATED FACTORS  [ ] Age 41-60 years                                            (1 Point)                    [ ] Bed rest                                                        (1 Point)  [ ] Age: 61-74 years                                           (2 Points)                  [ ] Plaster cast                                                   (2 Points)  [ ] Age= 75 years                                              (3 Points)                    [ ] Bed bound for more than 72 hours                 (2 Points)    DISEASE RELATED RISK FACTORS                                               GENDER SPECIFIC FACTORS  [ ] Edema in the lower extremities                       (1 Point)              [x] Pregnancy                                                     (1 Point)  [ ] Varicose veins                                               (1 Point)                     [ ] Post-partum < 6 weeks                                   (1 Point)             [ ] BMI > 25 Kg/m2                                            (1 Point)                     [ ] Hormonal therapy  or oral contraception          (1 Point)                 [ ] Sepsis (in the previous month)                        (1 Point)               [ ] History of pregnancy complications                 (1 point)  [ ] Pneumonia or serious lung disease                                               [ ] Unexplained or recurrent                     (1 Point)           (in the previous month)                               (1 Point)  [ ] Abnormal pulmonary function test                     (1 Point)                 SURGERY RELATED RISK FACTORS  [ ] Acute myocardial infarction                              (1 Point)               [x]  Section                                             (1 Point)  [ ] Congestive heart failure (in the previous month)  (1 Point)      [ ] Minor surgery                                                  (1 Point)   [ ] Inflammatory bowel disease                             (1 Point)               [ ] Arthroscopic surgery                                        (2 Points)  [ ] Central venous access                                      (2 Points)                [ ] General surgery lasting more than 45 minutes (2 points)  [ ] Malignancy- Present or previous                   (2 Points)                [ ] Elective arthroplasty                                         (5 points)    [ ] Stroke (in the previous month)                          (5 Points)                                                                                                                                                           HEMATOLOGY RELATED FACTORS                                                 TRAUMA RELATED RISK FACTORS  [ ] Prior episodes of VTE                                     (3 Points)                [ ] Fracture of the hip, pelvis, or leg                       (5 Points)  [ ] Positive family history for VTE                         (3 Points)             [ ] Acute spinal cord injury (in the previous month)  (5 Points)  [ ] Prothrombin 42478 A                                     (3 Points)               [ ] Paralysis  (less than 1 month)                             (5 Points)  [ ] Factor V Leiden                                             (3 Points)                  [ ] Multiple Trauma within 1 month                        (5 Points)  [ ] Lupus anticoagulants                                     (3 Points)                                                           [ ] Anticardiolipin antibodies                               (3 Points)                                                       [ ] High homocysteine in the blood                      (3 Points)                                             [ ] Other congenital or acquired thrombophilia      (3 Points)                                                [ ] Heparin induced thrombocytopenia                  (3 Points)                                     Total Score [ 2]

## 2021-07-09 NOTE — OB PST NOTE - PSH
H/O breast biopsy  july 7th 2021  findings benign  H/O dilation and curettage  June 2020  H/O wisdom tooth extraction

## 2021-07-09 NOTE — OB PST NOTE - PROBLEM SELECTOR PLAN 1
Section  -cbc, bmp, type and screen done at [facility]  -preop instructions  -preop covid screen  at [facility]  -type and screen DOS

## 2021-07-09 NOTE — OB PST NOTE - HISTORY OF PRESENT ILLNESS
30 year old female  who presents to [facility] for evaluation prior to primary  Section on 7/15/2021.    preop covid screen

## 2021-07-14 ENCOUNTER — TRANSCRIPTION ENCOUNTER (OUTPATIENT)
Age: 31
End: 2021-07-14

## 2021-07-14 ENCOUNTER — OUTPATIENT (OUTPATIENT)
Dept: OUTPATIENT SERVICES | Facility: HOSPITAL | Age: 31
LOS: 1 days | End: 2021-07-14
Payer: COMMERCIAL

## 2021-07-14 DIAGNOSIS — Z98.890 OTHER SPECIFIED POSTPROCEDURAL STATES: Chronic | ICD-10-CM

## 2021-07-14 DIAGNOSIS — Z11.52 ENCOUNTER FOR SCREENING FOR COVID-19: ICD-10-CM

## 2021-07-14 DIAGNOSIS — K08.409 PARTIAL LOSS OF TEETH, UNSPECIFIED CAUSE, UNSPECIFIED CLASS: Chronic | ICD-10-CM

## 2021-07-14 LAB — SARS-COV-2 RNA SPEC QL NAA+PROBE: SIGNIFICANT CHANGE UP

## 2021-07-14 PROCEDURE — C9803: CPT

## 2021-07-14 PROCEDURE — U0003: CPT

## 2021-07-14 PROCEDURE — U0005: CPT

## 2021-07-15 ENCOUNTER — INPATIENT (INPATIENT)
Facility: HOSPITAL | Age: 31
LOS: 1 days | Discharge: HOME | End: 2021-07-17
Attending: OBSTETRICS & GYNECOLOGY | Admitting: OBSTETRICS & GYNECOLOGY
Payer: COMMERCIAL

## 2021-07-15 VITALS — HEART RATE: 98 BPM | SYSTOLIC BLOOD PRESSURE: 114 MMHG | DIASTOLIC BLOOD PRESSURE: 72 MMHG

## 2021-07-15 DIAGNOSIS — K08.409 PARTIAL LOSS OF TEETH, UNSPECIFIED CAUSE, UNSPECIFIED CLASS: Chronic | ICD-10-CM

## 2021-07-15 DIAGNOSIS — Z98.890 OTHER SPECIFIED POSTPROCEDURAL STATES: Chronic | ICD-10-CM

## 2021-07-15 LAB
BASOPHILS # BLD AUTO: 0.01 K/UL — SIGNIFICANT CHANGE UP (ref 0–0.2)
BASOPHILS NFR BLD AUTO: 0.1 % — SIGNIFICANT CHANGE UP (ref 0–2)
BLD GP AB SCN SERPL QL: POSITIVE — SIGNIFICANT CHANGE UP
COVID-19 SPIKE DOMAIN AB INTERP: POSITIVE
COVID-19 SPIKE DOMAIN ANTIBODY RESULT: >250 U/ML — HIGH
EOSINOPHIL # BLD AUTO: 0.03 K/UL — SIGNIFICANT CHANGE UP (ref 0–0.5)
EOSINOPHIL NFR BLD AUTO: 0.3 % — SIGNIFICANT CHANGE UP (ref 0–6)
HCT VFR BLD CALC: 35.1 % — SIGNIFICANT CHANGE UP (ref 34.5–45)
HGB BLD-MCNC: 11.6 G/DL — SIGNIFICANT CHANGE UP (ref 11.5–15.5)
IMM GRANULOCYTES NFR BLD AUTO: 0.9 % — SIGNIFICANT CHANGE UP (ref 0–1.5)
LYMPHOCYTES # BLD AUTO: 1.66 K/UL — SIGNIFICANT CHANGE UP (ref 1–3.3)
LYMPHOCYTES # BLD AUTO: 18.9 % — SIGNIFICANT CHANGE UP (ref 13–44)
MCHC RBC-ENTMCNC: 30.9 PG — SIGNIFICANT CHANGE UP (ref 27–34)
MCHC RBC-ENTMCNC: 33 GM/DL — SIGNIFICANT CHANGE UP (ref 32–36)
MCV RBC AUTO: 93.6 FL — SIGNIFICANT CHANGE UP (ref 80–100)
MONOCYTES # BLD AUTO: 0.81 K/UL — SIGNIFICANT CHANGE UP (ref 0–0.9)
MONOCYTES NFR BLD AUTO: 9.2 % — SIGNIFICANT CHANGE UP (ref 2–14)
NEUTROPHILS # BLD AUTO: 6.21 K/UL — SIGNIFICANT CHANGE UP (ref 1.8–7.4)
NEUTROPHILS NFR BLD AUTO: 70.6 % — SIGNIFICANT CHANGE UP (ref 43–77)
NRBC # BLD: 0 /100 WBCS — SIGNIFICANT CHANGE UP (ref 0–0)
PLATELET # BLD AUTO: 357 K/UL — SIGNIFICANT CHANGE UP (ref 150–400)
RBC # BLD: 3.75 M/UL — LOW (ref 3.8–5.2)
RBC # FLD: 14 % — SIGNIFICANT CHANGE UP (ref 10.3–14.5)
RH IG SCN BLD-IMP: POSITIVE — SIGNIFICANT CHANGE UP
SARS-COV-2 IGG+IGM SERPL QL IA: >250 U/ML — HIGH
SARS-COV-2 IGG+IGM SERPL QL IA: POSITIVE
T PALLIDUM AB TITR SER: NEGATIVE — SIGNIFICANT CHANGE UP
WBC # BLD: 8.8 K/UL — SIGNIFICANT CHANGE UP (ref 3.8–10.5)
WBC # FLD AUTO: 8.8 K/UL — SIGNIFICANT CHANGE UP (ref 3.8–10.5)

## 2021-07-15 PROCEDURE — 86077 PHYS BLOOD BANK SERV XMATCH: CPT

## 2021-07-15 RX ORDER — NALBUPHINE HYDROCHLORIDE 10 MG/ML
2.5 INJECTION, SOLUTION INTRAMUSCULAR; INTRAVENOUS; SUBCUTANEOUS EVERY 6 HOURS
Refills: 0 | Status: DISCONTINUED | OUTPATIENT
Start: 2021-07-15 | End: 2021-07-16

## 2021-07-15 RX ORDER — DEXAMETHASONE 0.5 MG/5ML
4 ELIXIR ORAL EVERY 6 HOURS
Refills: 0 | Status: DISCONTINUED | OUTPATIENT
Start: 2021-07-15 | End: 2021-07-16

## 2021-07-15 RX ORDER — OXYCODONE HYDROCHLORIDE 5 MG/1
5 TABLET ORAL ONCE
Refills: 0 | Status: DISCONTINUED | OUTPATIENT
Start: 2021-07-15 | End: 2021-07-17

## 2021-07-15 RX ORDER — MORPHINE SULFATE 50 MG/1
0.1 CAPSULE, EXTENDED RELEASE ORAL ONCE
Refills: 0 | Status: DISCONTINUED | OUTPATIENT
Start: 2021-07-15 | End: 2021-07-16

## 2021-07-15 RX ORDER — DIPHENHYDRAMINE HCL 50 MG
25 CAPSULE ORAL EVERY 6 HOURS
Refills: 0 | Status: DISCONTINUED | OUTPATIENT
Start: 2021-07-15 | End: 2021-07-17

## 2021-07-15 RX ORDER — OXYTOCIN 10 UNIT/ML
333.33 VIAL (ML) INJECTION
Qty: 20 | Refills: 0 | Status: COMPLETED | OUTPATIENT
Start: 2021-07-15 | End: 2021-07-15

## 2021-07-15 RX ORDER — ACETAMINOPHEN 500 MG
975 TABLET ORAL
Refills: 0 | Status: DISCONTINUED | OUTPATIENT
Start: 2021-07-15 | End: 2021-07-17

## 2021-07-15 RX ORDER — LANOLIN
1 OINTMENT (GRAM) TOPICAL EVERY 6 HOURS
Refills: 0 | Status: DISCONTINUED | OUTPATIENT
Start: 2021-07-15 | End: 2021-07-17

## 2021-07-15 RX ORDER — MAGNESIUM HYDROXIDE 400 MG/1
30 TABLET, CHEWABLE ORAL
Refills: 0 | Status: DISCONTINUED | OUTPATIENT
Start: 2021-07-15 | End: 2021-07-17

## 2021-07-15 RX ORDER — NALOXONE HYDROCHLORIDE 4 MG/.1ML
0.1 SPRAY NASAL
Refills: 0 | Status: DISCONTINUED | OUTPATIENT
Start: 2021-07-15 | End: 2021-07-16

## 2021-07-15 RX ORDER — OXYTOCIN 10 UNIT/ML
333.33 VIAL (ML) INJECTION
Qty: 20 | Refills: 0 | Status: DISCONTINUED | OUTPATIENT
Start: 2021-07-15 | End: 2021-07-17

## 2021-07-15 RX ORDER — SIMETHICONE 80 MG/1
80 TABLET, CHEWABLE ORAL EVERY 4 HOURS
Refills: 0 | Status: DISCONTINUED | OUTPATIENT
Start: 2021-07-15 | End: 2021-07-17

## 2021-07-15 RX ORDER — HEPARIN SODIUM 5000 [USP'U]/ML
5000 INJECTION INTRAVENOUS; SUBCUTANEOUS EVERY 12 HOURS
Refills: 0 | Status: DISCONTINUED | OUTPATIENT
Start: 2021-07-15 | End: 2021-07-17

## 2021-07-15 RX ORDER — SODIUM CHLORIDE 9 MG/ML
1000 INJECTION, SOLUTION INTRAVENOUS
Refills: 0 | Status: DISCONTINUED | OUTPATIENT
Start: 2021-07-15 | End: 2021-07-15

## 2021-07-15 RX ORDER — SODIUM CHLORIDE 9 MG/ML
1000 INJECTION, SOLUTION INTRAVENOUS ONCE
Refills: 0 | Status: COMPLETED | OUTPATIENT
Start: 2021-07-15 | End: 2021-07-15

## 2021-07-15 RX ORDER — TETANUS TOXOID, REDUCED DIPHTHERIA TOXOID AND ACELLULAR PERTUSSIS VACCINE, ADSORBED 5; 2.5; 8; 8; 2.5 [IU]/.5ML; [IU]/.5ML; UG/.5ML; UG/.5ML; UG/.5ML
0.5 SUSPENSION INTRAMUSCULAR ONCE
Refills: 0 | Status: DISCONTINUED | OUTPATIENT
Start: 2021-07-15 | End: 2021-07-17

## 2021-07-15 RX ORDER — SODIUM CHLORIDE 9 MG/ML
1000 INJECTION, SOLUTION INTRAVENOUS
Refills: 0 | Status: DISCONTINUED | OUTPATIENT
Start: 2021-07-15 | End: 2021-07-17

## 2021-07-15 RX ORDER — CITRIC ACID/SODIUM CITRATE 300-500 MG
30 SOLUTION, ORAL ORAL ONCE
Refills: 0 | Status: COMPLETED | OUTPATIENT
Start: 2021-07-15 | End: 2021-07-15

## 2021-07-15 RX ORDER — IBUPROFEN 200 MG
600 TABLET ORAL EVERY 6 HOURS
Refills: 0 | Status: COMPLETED | OUTPATIENT
Start: 2021-07-15 | End: 2022-06-13

## 2021-07-15 RX ORDER — ONDANSETRON 8 MG/1
4 TABLET, FILM COATED ORAL EVERY 6 HOURS
Refills: 0 | Status: DISCONTINUED | OUTPATIENT
Start: 2021-07-15 | End: 2021-07-16

## 2021-07-15 RX ORDER — OXYCODONE HYDROCHLORIDE 5 MG/1
5 TABLET ORAL
Refills: 0 | Status: DISCONTINUED | OUTPATIENT
Start: 2021-07-15 | End: 2021-07-17

## 2021-07-15 RX ORDER — FAMOTIDINE 10 MG/ML
20 INJECTION INTRAVENOUS ONCE
Refills: 0 | Status: COMPLETED | OUTPATIENT
Start: 2021-07-15 | End: 2021-07-15

## 2021-07-15 RX ORDER — KETOROLAC TROMETHAMINE 30 MG/ML
30 SYRINGE (ML) INJECTION EVERY 6 HOURS
Refills: 0 | Status: DISCONTINUED | OUTPATIENT
Start: 2021-07-15 | End: 2021-07-17

## 2021-07-15 RX ADMIN — Medication 30 MILLILITER(S): at 07:38

## 2021-07-15 RX ADMIN — Medication 975 MILLIGRAM(S): at 15:16

## 2021-07-15 RX ADMIN — Medication 30 MILLIGRAM(S): at 23:29

## 2021-07-15 RX ADMIN — Medication 30 MILLIGRAM(S): at 17:51

## 2021-07-15 RX ADMIN — Medication 4 MILLIGRAM(S): at 15:48

## 2021-07-15 RX ADMIN — SIMETHICONE 80 MILLIGRAM(S): 80 TABLET, CHEWABLE ORAL at 20:43

## 2021-07-15 RX ADMIN — Medication 4 MILLIGRAM(S): at 10:08

## 2021-07-15 RX ADMIN — HEPARIN SODIUM 5000 UNIT(S): 5000 INJECTION INTRAVENOUS; SUBCUTANEOUS at 16:57

## 2021-07-15 RX ADMIN — SODIUM CHLORIDE 2000 MILLILITER(S): 9 INJECTION, SOLUTION INTRAVENOUS at 07:10

## 2021-07-15 RX ADMIN — Medication 975 MILLIGRAM(S): at 20:43

## 2021-07-15 RX ADMIN — Medication 30 MILLIGRAM(S): at 16:56

## 2021-07-15 RX ADMIN — SODIUM CHLORIDE 125 MILLILITER(S): 9 INJECTION, SOLUTION INTRAVENOUS at 14:11

## 2021-07-15 RX ADMIN — FAMOTIDINE 20 MILLIGRAM(S): 10 INJECTION INTRAVENOUS at 07:38

## 2021-07-15 RX ADMIN — Medication 975 MILLIGRAM(S): at 21:40

## 2021-07-15 RX ADMIN — ONDANSETRON 4 MILLIGRAM(S): 8 TABLET, FILM COATED ORAL at 10:08

## 2021-07-15 RX ADMIN — Medication 1000 MILLIUNIT(S)/MIN: at 14:11

## 2021-07-15 RX ADMIN — Medication 975 MILLIGRAM(S): at 16:00

## 2021-07-15 NOTE — OB RN DELIVERY SUMMARY - NS_SEPSISRSKCALC_OBGYN_ALL_OB_FT
EOS calculated successfully. EOS Risk Factor: 0.5/1000 live births ([facility] national incidence); GA=39w;Temp=98.2; ROM=0.017; GBS='Negative'; Antibiotics='No antibiotics or any antibiotics < 2 hrs prior to birth'

## 2021-07-15 NOTE — OB PROVIDER H&P - ATTENDING COMMENTS
P0 at term, c/s for breech.    consent obtained  fetal intracranial cyst,  MRI  anesthesia consult  awaiting OR  calluzzo

## 2021-07-15 NOTE — OB RN DELIVERY SUMMARY - NSSELHIDDEN_OBGYN_ALL_OB_FT
[NS_DeliveryAttending1_OBGYN_ALL_OB_FT:[id number]],[NS_DeliveryAssist1_OBGYN_ALL_OB_FT:[id number]],[NS_DeliveryRN_OBGYN_ALL_OB_FT:[id number]]

## 2021-07-15 NOTE — OB PROVIDER H&P - ASSESSMENT
A/P    Patient is a 29 y/o  @ 39w0d who presents for  due to breech presentation.     - Admit to L&D  - Routine labs   - COVID swab already collected   - EFM/TOCO  - NPO  - Anesthesia consult   - Bicitra  - Nursing orders  - Pre-op orders  - Consents  -     Note written by Zach Catalan, MS3 (medical student)  Reviewed by Jessa Louise  Case discussed with Dr. Paul A/P    Patient is a 31 y/o  @ 39w0d who presents for  due to breech presentation.     - Admit to L&D  - Routine labs   - COVID swab already collected   - EFM/TOCO  - NPO  - Anesthesia consult   - Bicitra  - Nursing orders  - Pre-op orders  - Consents  -   - MMR following delivery    Note written by Zach Catalan, MS3 (medical student)  Reviewed by Jessa Louise  Case discussed with Dr. Paul A/P  Patient is a 29 y/o  @ 39w0d who presents for  due to breech presentation.     - Admit to L&D  - Routine labs   - COVID swab already collected   - EFM/TOCO  - NPO  - Anesthesia consult   - Bicitra  - Nursing orders  - Pre-op orders  - Consents  -   - MMR following delivery    Note written by Zach Catalan, MS3 (medical student)  Reviewed by Jessa Louise  Case discussed with Dr. Paul

## 2021-07-15 NOTE — OB RN INTRAOPERATIVE NOTE - NSSELHIDDEN_OBGYN_ALL_OB_FT
[NS_DeliveryAttending1_OBGYN_ALL_OB_FT:[id number]],[NS_DeliveryAssist1_OBGYN_ALL_OB_FT:[id number]],[NS_DeliveryRN_OBGYN_ALL_OB_FT:[id number]==]

## 2021-07-15 NOTE — OB PROVIDER DELIVERY SUMMARY - NSPROVIDERDELIVERYNOTE_OBGYN_ALL_OB_FT
primary LTCS, uncomplicated  viable female infant, breech presentation, Apgars 9/9, cord gasses sent  Grossly normal fallopian tubes, uterus, and ovaries  Uterus closed in 2 layers  Interceed placed over hysterotomy   Ancef 2g given    EBL: 900  IVF: 2500  UOP: 275

## 2021-07-15 NOTE — OB PROVIDER DELIVERY SUMMARY - NSSELHIDDEN_OBGYN_ALL_OB_FT
[NS_DeliveryAttending1_OBGYN_ALL_OB_FT:[id number]==],[NS_DeliveryAssist1_OBGYN_ALL_OB_FT:[id number]],[NS_DeliveryRN_OBGYN_ALL_OB_FT:[id number]==]

## 2021-07-15 NOTE — OB PROVIDER H&P - HISTORY OF PRESENT ILLNESS
Patient is a 31 y/o  @ 39w0d who presents for  due to breech presentation.  ECV was attempted in office on 2021 and was unsuccessful.  Pregnancy complicated by 1. subchorionic hematoma in 1st trimester, since resolved 2. Bloody nipple discharge from left breast during pregnancy.  Had US, mammogram and biopsy, which was benign.  3. Intracranial cyst was found on US, fetus needs MRI after birth.  Patient is seen by [facility]; last seen in the office yesterday (2021) and was 0 cm dilated.   denies vaginal bleeding, ctx, and LOF, +FM.     GBS negative   EFW 3500 grams (was 5 lb 8 oz according to US on ).     OBHx: miscarriage 2020 @8 weeks 6 days (with D&C)  GYNHx: No ovarian cysts, fibroids, hx of abnormal pap or STDs  PMHx: No hx of DM, HTN, asthma, bleeding or clotting disorders or blood transfusions.  PSurgHx: Breast biopsy  (during this pregnancy), D&C 2020, wisdom teeth extraction  Allergies: NKDA  Meds: PNV  Social: No smoking, alcohol, or illicit drug use during pregnancy   Psych: No hx of anxiety or depression Patient is a 29 y/o  @ 39w0d who presents for  due to breech presentation. -VB, -LOF, -Ctx, +FM. Denies other concerns.     PNC: ECV was attempted in office on 2021 and was unsuccessful. Subchorionic hematoma, resolved. Bloody nipple discharge with neg w/u including sono/mammo/bx.     FA:  Intracranial cyst was found on US, fetus needs MRI after birth.       GBS negative   EFW 3500 grams (was 5 lb 8 oz according to US on ).     OBHx: MAB s/p D&C at 8w6d  GYNHx: No ovarian cysts, fibroids, hx of abnormal pap or STDs  PMHx: No hx of DM, HTN, asthma, bleeding or clotting disorders or blood transfusions.  PSurgHx: Breast biopsy  (during this pregnancy), D&C 2020, wisdom teeth extraction  Allergies: NKDA  Meds: PNV  Social: No smoking, alcohol, or illicit drug use during pregnancy   Psych: No hx of anxiety or depression

## 2021-07-15 NOTE — OB NEONATOLOGY/PEDIATRICIAN DELIVERY SUMMARY - NSPEDSNEONOTESA_OBGYN_ALL_OB_FT
Pediatrician called to delivery for primary  due to breech presentation. Female infant born at 39wks via  to a 29 y/o  blood type B+ mother (with positive antibodies). Maternal history of miscarriage, bloody discharge from L breast during pregnancy (mammo, negative biopsy), and hematuria during first trimester. During attempted inversion, fetal intracranial cyst was noted (needs MRI). Prenatal labs nr/-, Rubella not immune, GBS - on . ROM at delivery with clear fluids. Baby emerged vigorous, crying. Cord clamping delayed 45sec. Infant was brought to radiant warmer and warmed, dried, stimulated and suctioned. HR>100, normal respiratory effort. APGARS of 9/9. Mom is initiating breast feeding. Consents to Hepatitis B vaccination. No labor, no rupture, thus EOS score not applicable. Pediatrician is Dr. Kang. Pediatrician called to delivery for primary  due to breech presentation. Female infant born at 39wks via  to a 33 y/o  blood type B+ mother (with positive antibodies). Maternal history of miscarriage, bloody discharge from L breast during pregnancy (mammo, negative biopsy), and hematuria during first trimester. During attempted external cephalic version, fetal intracranial cyst was noted by MFM (needs f/u MRI). Prenatal labs nr/-, Rubella not immune, GBS - on . ROM at delivery with clear fluids. Baby emerged vigorous, crying. Cord clamping delayed 45sec. Infant was brought to radiant warmer and warmed, dried, stimulated and suctioned. HR>100, normal respiratory effort. APGARS of 9/9. Mom is initiating breast feeding. Consents to Hepatitis B vaccination. No labor, no rupture, thus EOS score not applicable. Pediatrician is Dr. Kang.

## 2021-07-15 NOTE — OB PROVIDER H&P - NSHPPHYSICALEXAM_GEN_ALL_CORE
PE:  T(C): --  HR: 98 (07-15-21 @ 06:50) (98 - 98)  BP: 114/72 (07-15-21 @ 06:50) (114/72 - 114/72)  RR: --  SpO2: --  General: NAD, A&Ox3  CV: RRR  Lungs: Clear bilat   Abd: soft, nontender, gravid  Extremities: no LE redness, swelling or tenderness.   EFM: 145bpm/moderate variablity/+accels/-decels  TOCO: none

## 2021-07-15 NOTE — OB RN INTRAOPERATIVE NOTE - NS_ADDITIONALPROCINFO1_OBGYN_ALL_OB_FT
QBL =   urine = QBL = 910 ml per Triton  urine =275 ml  Concentrated oscar urine  EBL = 900 ml per MD

## 2021-07-16 LAB
HCT VFR BLD CALC: 29.4 % — LOW (ref 34.5–45)
HGB BLD-MCNC: 9.6 G/DL — LOW (ref 11.5–15.5)
MCHC RBC-ENTMCNC: 30.1 PG — SIGNIFICANT CHANGE UP (ref 27–34)
MCHC RBC-ENTMCNC: 32.7 GM/DL — SIGNIFICANT CHANGE UP (ref 32–36)
MCV RBC AUTO: 92.2 FL — SIGNIFICANT CHANGE UP (ref 80–100)
NRBC # BLD: 0 /100 WBCS — SIGNIFICANT CHANGE UP (ref 0–0)
PLATELET # BLD AUTO: 300 K/UL — SIGNIFICANT CHANGE UP (ref 150–400)
RBC # BLD: 3.19 M/UL — LOW (ref 3.8–5.2)
RBC # FLD: 14.1 % — SIGNIFICANT CHANGE UP (ref 10.3–14.5)
WBC # BLD: 18.05 K/UL — HIGH (ref 3.8–10.5)
WBC # FLD AUTO: 18.05 K/UL — HIGH (ref 3.8–10.5)

## 2021-07-16 RX ORDER — IBUPROFEN 200 MG
600 TABLET ORAL EVERY 6 HOURS
Refills: 0 | Status: DISCONTINUED | OUTPATIENT
Start: 2021-07-16 | End: 2021-07-17

## 2021-07-16 RX ADMIN — Medication 975 MILLIGRAM(S): at 15:14

## 2021-07-16 RX ADMIN — Medication 975 MILLIGRAM(S): at 09:24

## 2021-07-16 RX ADMIN — Medication 600 MILLIGRAM(S): at 23:54

## 2021-07-16 RX ADMIN — HEPARIN SODIUM 5000 UNIT(S): 5000 INJECTION INTRAVENOUS; SUBCUTANEOUS at 06:10

## 2021-07-16 RX ADMIN — Medication 30 MILLIGRAM(S): at 12:45

## 2021-07-16 RX ADMIN — HEPARIN SODIUM 5000 UNIT(S): 5000 INJECTION INTRAVENOUS; SUBCUTANEOUS at 18:01

## 2021-07-16 RX ADMIN — Medication 30 MILLIGRAM(S): at 06:10

## 2021-07-16 RX ADMIN — Medication 975 MILLIGRAM(S): at 21:45

## 2021-07-16 RX ADMIN — Medication 30 MILLIGRAM(S): at 00:25

## 2021-07-16 RX ADMIN — Medication 975 MILLIGRAM(S): at 21:17

## 2021-07-16 RX ADMIN — Medication 975 MILLIGRAM(S): at 09:51

## 2021-07-16 RX ADMIN — Medication 975 MILLIGRAM(S): at 15:39

## 2021-07-16 RX ADMIN — Medication 30 MILLIGRAM(S): at 13:15

## 2021-07-16 RX ADMIN — Medication 975 MILLIGRAM(S): at 03:15

## 2021-07-16 RX ADMIN — Medication 30 MILLIGRAM(S): at 18:39

## 2021-07-16 RX ADMIN — Medication 975 MILLIGRAM(S): at 02:15

## 2021-07-16 RX ADMIN — Medication 30 MILLIGRAM(S): at 18:01

## 2021-07-16 NOTE — PROGRESS NOTE ADULT - ATTENDING COMMENTS
Doing well  VSS afeb  Abd S NT ND FF min lochia  inc c/d/i  S/P C/S stable  Check labs  Reg diet   Routine care  LYN Giordano

## 2021-07-17 ENCOUNTER — TRANSCRIPTION ENCOUNTER (OUTPATIENT)
Age: 31
End: 2021-07-17

## 2021-07-17 VITALS
OXYGEN SATURATION: 96 % | RESPIRATION RATE: 18 BRPM | DIASTOLIC BLOOD PRESSURE: 61 MMHG | HEART RATE: 92 BPM | SYSTOLIC BLOOD PRESSURE: 99 MMHG | TEMPERATURE: 98 F

## 2021-07-17 PROCEDURE — 86780 TREPONEMA PALLIDUM: CPT

## 2021-07-17 PROCEDURE — 86900 BLOOD TYPING SEROLOGIC ABO: CPT

## 2021-07-17 PROCEDURE — 86922 COMPATIBILITY TEST ANTIGLOB: CPT

## 2021-07-17 PROCEDURE — 85025 COMPLETE CBC W/AUTO DIFF WBC: CPT

## 2021-07-17 PROCEDURE — 90707 MMR VACCINE SC: CPT

## 2021-07-17 PROCEDURE — 59050 FETAL MONITOR W/REPORT: CPT

## 2021-07-17 PROCEDURE — 86880 COOMBS TEST DIRECT: CPT

## 2021-07-17 PROCEDURE — 59025 FETAL NON-STRESS TEST: CPT

## 2021-07-17 PROCEDURE — 86769 SARS-COV-2 COVID-19 ANTIBODY: CPT

## 2021-07-17 PROCEDURE — 86901 BLOOD TYPING SEROLOGIC RH(D): CPT

## 2021-07-17 PROCEDURE — C1765: CPT

## 2021-07-17 PROCEDURE — 86850 RBC ANTIBODY SCREEN: CPT

## 2021-07-17 PROCEDURE — 86870 RBC ANTIBODY IDENTIFICATION: CPT

## 2021-07-17 RX ORDER — OXYCODONE HYDROCHLORIDE 5 MG/1
1 TABLET ORAL
Qty: 15 | Refills: 0
Start: 2021-07-17

## 2021-07-17 RX ADMIN — Medication 600 MILLIGRAM(S): at 00:25

## 2021-07-17 RX ADMIN — Medication 600 MILLIGRAM(S): at 13:37

## 2021-07-17 RX ADMIN — Medication 0.5 MILLILITER(S): at 10:15

## 2021-07-17 RX ADMIN — HEPARIN SODIUM 5000 UNIT(S): 5000 INJECTION INTRAVENOUS; SUBCUTANEOUS at 05:56

## 2021-07-17 RX ADMIN — Medication 600 MILLIGRAM(S): at 12:53

## 2021-07-17 RX ADMIN — Medication 600 MILLIGRAM(S): at 06:30

## 2021-07-17 RX ADMIN — Medication 975 MILLIGRAM(S): at 10:07

## 2021-07-17 RX ADMIN — Medication 600 MILLIGRAM(S): at 05:56

## 2021-07-17 RX ADMIN — Medication 975 MILLIGRAM(S): at 03:30

## 2021-07-17 RX ADMIN — MAGNESIUM HYDROXIDE 30 MILLILITER(S): 400 TABLET, CHEWABLE ORAL at 13:59

## 2021-07-17 RX ADMIN — Medication 975 MILLIGRAM(S): at 10:09

## 2021-07-17 RX ADMIN — Medication 975 MILLIGRAM(S): at 03:05

## 2021-07-17 NOTE — DISCHARGE NOTE OB - CARE PROVIDER_API CALL
Chandan Briceño (MD)  Obstetrics and Gynecology  [address], [address]  [place] [id number]  Phone: [phone]  Fax: [phone]  Follow Up Time:

## 2021-07-17 NOTE — DISCHARGE NOTE OB - MOOD SWINGS / DEPRESSION OR CRYING SPELLS LASTING MORE THAN 3 DAYS
2-part nondisplaced fracture of surgical neck of right humerus, initial encounter for closed fracture 2-part nondisplaced fracture of surgical neck of right humerus, initial encounter for closed fracture Statement Selected

## 2021-07-17 NOTE — DISCHARGE NOTE OB - PATIENT PORTAL LINK FT
You can access the FollowMyHealth Patient Portal offered by [facility] by registering at the following website: [email or web address]. By joining [employer]’s FollowMyHealth portal, you will also be able to view your health information using other applications (apps) compatible with our system.

## 2021-07-17 NOTE — PROGRESS NOTE ADULT - PROBLEM SELECTOR PLAN 1
Increase OOB  Pain protocol  DVT ppx  Regular diet  Routine Postpartum/Post-op care
Cont. PP/PO Care

## 2021-07-17 NOTE — PROGRESS NOTE ADULT - SUBJECTIVE AND OBJECTIVE BOX
Pain Management Attending Addendum    SUBJECTIVE:    Therapy:	  PCA	   Epidural           s/p Spinal Opoid    x          Postpartum infusion	  Patient controlled regional anesthesia (PCRA)    prn Analgesics    OBJECTIVE: No new signs   x   Other:    Side Effects:    None	x		 Other:    Assessment of Catheter Site:		 Intact		 Other:    ASSESSMENT/PLAN  Continue current therapy    Therapy changed to:     IV PCA        Epidural      prn Analgesics  x   post partum infusion    Comments:
PA POD # 2 C/S Note    Blood Type:   B+             Rubella:  Non-Immune             RPR:  NR    Pain:  Controlled  Complaints:  None  Pt. is ambulating, Voiding, passing flatus, & tolerating regular diet.  Lochia:  WNL    VS:  T(C): 36.7 (07-17-21 @ 05:40), Max: 36.8 (07-16-21 @ 17:10)  HR: 92 (07-17-21 @ 05:40) (73 - 92)  BP: 99/61 (07-17-21 @ 05:40) (92/60 - 126/72)  RR: 18 (07-17-21 @ 05:40) (18 - 18)  SpO2: 96% (07-17-21 @ 05:40) (96% - 98%)                        9.6    18.05 )-----------( 300      ( 16 Jul 2021 05:58 )             29.4     Abd:  Soft, non-distended, non-tender            Fundus-firm            Incision- C/D/I-SQ with Prineo  Extremities:  Edema - none                     Calf Tenderness - none      Assessment:    30 y.o. G 2 P 1011      POD # 2  S/P Primary C/S for Breech Presentation in stable condition.    PMHx significant for:  None  Current Issues:  Baby with Intracranial Brain Cyst found pre-natally, now awaiting results of f/u MRI performed yesterday evening  Plan:  Increase OOB             Cont. Pain Protocol             Cont. Reg. Diet             Cont. PO Care.            Cont. DVT PPx             MMR    AISLINN Mcnulty
`Postpartum Note-  Section POD#1    Prenatal Labs  Blood type: B Positive  Rubella IgG: NON-Immune  RPR: Negative          S:Patient w/o complaints, pain is controlled.  Pt is OOB, tolerating PO, and voiding.  Denies flatuesLochia WNL.     O:  Vital Signs Last 24 Hrs  T(C): 36.7 (2021 05:00), Max: 36.9 (15 Jul 2021 11:15)  T(F): 98.1 (2021 05:00), Max: 98.4 (15 Jul 2021 11:15)  HR: 88 (2021 05:00) (71 - 98)  BP: 115/73 (2021 05:00) (101/63 - 126/65)  BP(mean): 84 (15 Jul 2021 14:00) (84 - 129)  RR: 17 (2021 05:00) (15 - 24)  SpO2: 97% (2021 05:00) (93% - 98%)  I&O's Summary    15 Jul 2021 07:01  -  2021 07:00  --------------------------------------------------------  IN: 3000 mL / OUT: 1675 mL / NET: 1325 mL        Gen: NAD  Abdomen: Soft, appropriate tenderness, non-distended, fundus firm.  Incision: Clean/dry/ intact.  Neg erythema,  Neg ecchymosis .  Sub Q     Lochia WNL  Ext: SCDs in place.  Neg Edema. Nontender    LABS:             9.6    18.05 )-----------( 300      (  @ 05:58 )             29.4                11.6   8.80  )-----------( 357      ( 07-15 @ 07:20 )             35.1

## 2021-07-17 NOTE — DISCHARGE NOTE OB - CARE PLAN
Principal Discharge DX:	 delivery delivered  Goal:	resume normal activities  Assessment and plan of treatment:	resume normal activities as tolerated   Principal Discharge DX:	 delivery delivered  Goal:	resume normal activities  Assessment and plan of treatment:	rto 2 weeks or as necessary  rx provided for oxycodone in case needed

## 2021-07-17 NOTE — DISCHARGE NOTE OB - PLAN OF CARE
resume normal activities resume normal activities as tolerated rto 2 weeks or as necessary  rx provided for oxycodone in case needed

## 2021-07-17 NOTE — DISCHARGE NOTE OB - MEDICATION SUMMARY - MEDICATIONS TO TAKE
I will START or STAY ON the medications listed below when I get home from the hospital:    Motrin  mg oral tablet  -- 2 tab(s) by mouth every 4 - 6  hours, As Needed  -- Indication: For pain relief    Tylenol 325 mg oral tablet  -- 2 tab(s) by mouth every 4 - 6 hours, As Needed  -- Indication: For pain relief    oxyCODONE 5 mg oral tablet  -- 1 tab(s) by mouth every 4 - 6hours, As Needed -for moderate pain MDD:6   -- Indication: For pain relief

## 2021-07-17 NOTE — PROGRESS NOTE ADULT - ASSESSMENT
A/P:  30y  POD # 1 S/P primary   section for breech  Doing well    PMHx:  Bloody nipple discharge during pregnancy with neg w/u including sono/mammo/bx.   FA:  Intracranial cyst was found on US, Baby scheduled for  MRI today.     Current Issues: none   
 30 y.o. G 2 P 1011      POD # 2  S/P Primary C/S for Breech Presentation in stable condition.

## 2021-10-21 ENCOUNTER — RESULT REVIEW (OUTPATIENT)
Age: 31
End: 2021-10-21

## 2022-04-11 NOTE — OB RN TRIAGE NOTE - NSCTXPAIN_OBGYN_ALL_OB
"Orthopedic Progress Note      Assessment: 3 Days Post-Op  S/P Procedure(s):  RIGHT TOTAL KNEE ARTHROPLASTY     Plan:   - pt has CPM machine in room (unknown how this was ordered) but she may use this a few times a day 0 to 90 degrees flexion, as tolerated. She will continue CPM at TCU.   - discussed with hospitalist regarding hgb drop to 9.0.  Post op status of the knee is appropriate and no concern for excess bleeding in the knee, had low blood loss in surgery.   -May remove Ace wrap as needed, would recommend to remove Ace wrap when patient is using CPM machine.  - Continue PT/OT  - Weightbearing status: WBAT   - continue pain management prn   - Anticoagulation: xarelto per medicine in addition to SCDs, abilio stockings and early ambulation.  - Discharge planning: TCU pending medical clearance.  Okay to discharge from orthopedic standpoint when dizziness and hypotension have stabilized.      Subjective:  Patient reports she does have pain and stiffness in the knee but seems appropriate for postop period.  States she has continued to have intermittent dizziness, orthostatic hypotension.  Therapy has been limited due to this.  Patient has a CPM seen in her room and states she has this twice yesterday.    Objective:  /57 (BP Location: Right arm)   Pulse 90   Temp 98  F (36.7  C) (Oral)   Resp 18   Ht 1.575 m (5' 2\")   Wt 61.2 kg (135 lb)   SpO2 96%   BMI 24.69 kg/m    The patient is A&Ox3. Appears comfortable.   Appropriate swelling and ecchymosis of the right knee.  Diffuse tenderness to palpation throughout also appropriate for postop status.  Sensation is intact.  Dorsiflexion and plantar flexion is intact.  Dorsalis pedis pulse intact.  Calves are soft and non-tender.   Ace wrap was removed.  The incision is covered. Dressing has small amount of dried blood over distal aspect of the incision.  Ace wrap reapplied.      Pertinent Labs   Lab Results: personally reviewed.   Lab Results   Component Value " Date    INR 0.98 05/27/2021    INR 1.13 03/29/2012    INR 2.70 (H) 03/27/2012     Lab Results   Component Value Date    WBC 8.3 04/04/2022    HGB 9.0 (L) 04/10/2022    HCT 42.0 04/04/2022    MCV 96 04/04/2022     04/04/2022     Lab Results   Component Value Date     04/04/2022    CO2 31 04/04/2022         Report completed by:  Eleanor Mena PA-C, ROBIN  Date: 4/11/2022  Time: 10:43 AM     Yes

## 2022-07-26 NOTE — H&P PST ADULT - PATIENT ON (OXYGEN DELIVERY METHOD)
--s/p CABG 2005; NST Fall of 2021 (-) per cardiology notes on last admit.    --CONT: Atorvastatin 40mg PO QHS. room air

## 2022-12-21 NOTE — ASU PATIENT PROFILE, ADULT - INTERNATIONAL TRAVEL
Timoteo Rolon)  Plastic Surgery; Surgery of the Hand  [address] [place] [address], [address]  [place] [address]  Phone: [phone]  Fax: [phone]  Follow Up Time:    No

## 2023-03-07 ENCOUNTER — NON-APPOINTMENT (OUTPATIENT)
Age: 33
End: 2023-03-07

## 2023-03-21 ENCOUNTER — NON-APPOINTMENT (OUTPATIENT)
Age: 33
End: 2023-03-21

## 2023-03-22 ENCOUNTER — APPOINTMENT (OUTPATIENT)
Dept: SURGICAL ONCOLOGY | Facility: CLINIC | Age: 33
End: 2023-03-22
Payer: COMMERCIAL

## 2023-03-22 VITALS
HEART RATE: 84 BPM | BODY MASS INDEX: 23.39 KG/M2 | RESPIRATION RATE: 15 BRPM | DIASTOLIC BLOOD PRESSURE: 69 MMHG | HEIGHT: 59 IN | WEIGHT: 116 LBS | OXYGEN SATURATION: 98 % | SYSTOLIC BLOOD PRESSURE: 102 MMHG

## 2023-03-22 DIAGNOSIS — N60.19 DIFFUSE CYSTIC MASTOPATHY OF UNSPECIFIED BREAST: ICD-10-CM

## 2023-03-22 PROCEDURE — 99204 OFFICE O/P NEW MOD 45 MIN: CPT

## 2023-03-22 NOTE — OB HISTORY
[Menstruating] : The patient is menstruating [Total Preg ___] : G[unfilled] [Live Births ___] : P[unfilled]  [Definite ___ (Date)] : the last menstrual period was [unfilled]

## 2023-03-27 PROBLEM — N60.19 FIBROCYSTIC BREAST CHANGES: Status: ACTIVE | Noted: 2023-03-27

## 2023-03-27 NOTE — PHYSICAL EXAM
[Normal] : supple, no neck mass and thyroid not enlarged [Normal Supraclavicular Lymph Nodes] : normal supraclavicular lymph nodes [Normal Neck Lymph Nodes] : normal neck lymph nodes  [Normal Groin Lymph Nodes] : normal groin lymph nodes [Normal Axillary Lymph Nodes] : normal axillary lymph nodes [Normal] : oriented to person, place and time, with appropriate affect [de-identified] : no masses or adenopathy bilaterally. 2-3mm abrasions at nipples bilaterally. no evidence of infection.

## 2023-03-27 NOTE — CONSULT LETTER
[Dear  ___] : Dear  [unfilled], [Consult Letter:] : I had the pleasure of evaluating your patient, [unfilled]. [Please see my note below.] : Please see my note below. [Sincerely,] : Sincerely, [FreeTextEntry3] : I, Stephani Rivers, acted solely as a scribe for Dr. Mike Last on this date 03/15/2023.\par

## 2023-03-27 NOTE — ADDENDUM
[FreeTextEntry1] : I, Stephani Rivers, acted solely as a scribe for Dr. Mike Last on this date 03/22/2023.\par

## 2023-03-27 NOTE — HISTORY OF PRESENT ILLNESS
[de-identified] : Patient is a 31 y/o female who presents an initial consultation for breast pain. She is currently nursing her 20 month old. She was treated with oral antibiotics for mastitis as per Dr. Jarvis. Patient reports she attempted to wean her baby off 2 weeks ago and only nurses lightly at morning and night but complains of feeling pins and needles type pain for more than 2 months. \par \par Biopsy (7/7/2021):\par Left breast, core needle biopsy 9:00\par -Fibroadenomatoid changes \par \par Family history of breast cancer in her paternal grandmother.

## 2023-03-27 NOTE — ASSESSMENT
[FreeTextEntry1] : Bilateral breast sensations likely secondary to hormonal changes and decreased milk production\par Bilateral nipple excoriation from nursing\par No evidence of active infection\par Reassured patient that index of suspicion for malignancy is low\par Recommend continue weaning however patient wants to continue nursing at this time\par Also recommend bilateral mammogram and breast ultrasound once she stops nursing\par RTO as needed\par

## 2023-03-31 ENCOUNTER — RESULT REVIEW (OUTPATIENT)
Age: 33
End: 2023-03-31

## 2023-04-05 ENCOUNTER — OUTPATIENT (OUTPATIENT)
Dept: OUTPATIENT SERVICES | Facility: HOSPITAL | Age: 33
LOS: 1 days | End: 2023-04-05
Payer: COMMERCIAL

## 2023-04-05 DIAGNOSIS — Z98.890 OTHER SPECIFIED POSTPROCEDURAL STATES: Chronic | ICD-10-CM

## 2023-04-05 DIAGNOSIS — K08.409 PARTIAL LOSS OF TEETH, UNSPECIFIED CAUSE, UNSPECIFIED CLASS: Chronic | ICD-10-CM

## 2023-04-05 DIAGNOSIS — C80.1 MALIGNANT (PRIMARY) NEOPLASM, UNSPECIFIED: ICD-10-CM

## 2023-04-05 PROCEDURE — 88321 CONSLTJ&REPRT SLD PREP ELSWR: CPT

## 2023-04-07 LAB — SURGICAL PATHOLOGY STUDY: SIGNIFICANT CHANGE UP

## 2023-08-04 ENCOUNTER — APPOINTMENT (OUTPATIENT)
Dept: ANTEPARTUM | Facility: CLINIC | Age: 33
End: 2023-08-04
Payer: COMMERCIAL

## 2023-08-04 ENCOUNTER — ASOB RESULT (OUTPATIENT)
Age: 33
End: 2023-08-04

## 2023-08-04 ENCOUNTER — LABORATORY RESULT (OUTPATIENT)
Age: 33
End: 2023-08-04

## 2023-08-04 PROCEDURE — 76801 OB US < 14 WKS SINGLE FETUS: CPT

## 2023-08-04 PROCEDURE — 36416 COLLJ CAPILLARY BLOOD SPEC: CPT

## 2023-08-04 PROCEDURE — 76813 OB US NUCHAL MEAS 1 GEST: CPT

## 2023-10-06 ENCOUNTER — APPOINTMENT (OUTPATIENT)
Dept: ANTEPARTUM | Facility: CLINIC | Age: 33
End: 2023-10-06
Payer: COMMERCIAL

## 2023-10-06 ENCOUNTER — ASOB RESULT (OUTPATIENT)
Age: 33
End: 2023-10-06

## 2023-10-06 PROCEDURE — 76811 OB US DETAILED SNGL FETUS: CPT

## 2023-11-28 ENCOUNTER — APPOINTMENT (OUTPATIENT)
Dept: ORTHOPEDIC SURGERY | Facility: CLINIC | Age: 33
End: 2023-11-28
Payer: COMMERCIAL

## 2023-11-28 VITALS — HEIGHT: 59 IN | WEIGHT: 136 LBS | BODY MASS INDEX: 27.42 KG/M2

## 2023-11-28 DIAGNOSIS — Z78.9 OTHER SPECIFIED HEALTH STATUS: ICD-10-CM

## 2023-11-28 DIAGNOSIS — M22.2X1 PATELLOFEMORAL DISORDERS, RIGHT KNEE: ICD-10-CM

## 2023-11-28 PROCEDURE — 99203 OFFICE O/P NEW LOW 30 MIN: CPT

## 2024-01-01 NOTE — OB RN PATIENT PROFILE - NO PERTINENT FAMILY HISTORY
<<----- Click to add NO pertinent Family History Patient/Caregiver provided printed discharge information.

## 2024-02-06 ENCOUNTER — OUTPATIENT (OUTPATIENT)
Dept: OUTPATIENT SERVICES | Facility: HOSPITAL | Age: 34
LOS: 1 days | End: 2024-02-06

## 2024-02-06 VITALS
RESPIRATION RATE: 16 BRPM | SYSTOLIC BLOOD PRESSURE: 105 MMHG | HEIGHT: 59 IN | DIASTOLIC BLOOD PRESSURE: 72 MMHG | HEART RATE: 96 BPM | TEMPERATURE: 98 F | OXYGEN SATURATION: 97 % | WEIGHT: 145.06 LBS

## 2024-02-06 DIAGNOSIS — Z34.90 ENCOUNTER FOR SUPERVISION OF NORMAL PREGNANCY, UNSPECIFIED, UNSPECIFIED TRIMESTER: ICD-10-CM

## 2024-02-06 DIAGNOSIS — K08.409 PARTIAL LOSS OF TEETH, UNSPECIFIED CAUSE, UNSPECIFIED CLASS: Chronic | ICD-10-CM

## 2024-02-06 DIAGNOSIS — Z98.890 OTHER SPECIFIED POSTPROCEDURAL STATES: Chronic | ICD-10-CM

## 2024-02-06 DIAGNOSIS — Z98.891 HISTORY OF UTERINE SCAR FROM PREVIOUS SURGERY: Chronic | ICD-10-CM

## 2024-02-06 LAB
APPEARANCE UR: ABNORMAL
BACTERIA # UR AUTO: NEGATIVE /HPF — SIGNIFICANT CHANGE UP
BILIRUB UR-MCNC: NEGATIVE — SIGNIFICANT CHANGE UP
BLD GP AB SCN SERPL QL: NEGATIVE — SIGNIFICANT CHANGE UP
CAST: 0 /LPF — SIGNIFICANT CHANGE UP (ref 0–4)
COLOR SPEC: YELLOW — SIGNIFICANT CHANGE UP
DIFF PNL FLD: NEGATIVE — SIGNIFICANT CHANGE UP
GLUCOSE UR QL: NEGATIVE MG/DL — SIGNIFICANT CHANGE UP
HCT VFR BLD CALC: 36.8 % — SIGNIFICANT CHANGE UP (ref 34.5–45)
HGB BLD-MCNC: 12.5 G/DL — SIGNIFICANT CHANGE UP (ref 11.5–15.5)
KETONES UR-MCNC: NEGATIVE MG/DL — SIGNIFICANT CHANGE UP
LEUKOCYTE ESTERASE UR-ACNC: NEGATIVE — SIGNIFICANT CHANGE UP
MCHC RBC-ENTMCNC: 31.5 PG — SIGNIFICANT CHANGE UP (ref 27–34)
MCHC RBC-ENTMCNC: 34 GM/DL — SIGNIFICANT CHANGE UP (ref 32–36)
MCV RBC AUTO: 92.7 FL — SIGNIFICANT CHANGE UP (ref 80–100)
NITRITE UR-MCNC: NEGATIVE — SIGNIFICANT CHANGE UP
NRBC # BLD: 0 /100 WBCS — SIGNIFICANT CHANGE UP (ref 0–0)
NRBC # FLD: 0 K/UL — SIGNIFICANT CHANGE UP (ref 0–0)
PH UR: 7 — SIGNIFICANT CHANGE UP (ref 5–8)
PLATELET # BLD AUTO: 292 K/UL — SIGNIFICANT CHANGE UP (ref 150–400)
PROT UR-MCNC: NEGATIVE MG/DL — SIGNIFICANT CHANGE UP
RBC # BLD: 3.97 M/UL — SIGNIFICANT CHANGE UP (ref 3.8–5.2)
RBC # FLD: 13.4 % — SIGNIFICANT CHANGE UP (ref 10.3–14.5)
RBC CASTS # UR COMP ASSIST: 0 /HPF — SIGNIFICANT CHANGE UP (ref 0–4)
RH IG SCN BLD-IMP: POSITIVE — SIGNIFICANT CHANGE UP
SP GR SPEC: 1.02 — SIGNIFICANT CHANGE UP (ref 1–1.03)
SQUAMOUS # UR AUTO: 9 /HPF — HIGH (ref 0–5)
UROBILINOGEN FLD QL: 0.2 MG/DL — SIGNIFICANT CHANGE UP (ref 0.2–1)
WBC # BLD: 7.29 K/UL — SIGNIFICANT CHANGE UP (ref 3.8–10.5)
WBC # FLD AUTO: 7.29 K/UL — SIGNIFICANT CHANGE UP (ref 3.8–10.5)
WBC UR QL: 1 /HPF — SIGNIFICANT CHANGE UP (ref 0–5)

## 2024-02-06 RX ORDER — ACETAMINOPHEN 500 MG
2 TABLET ORAL
Qty: 0 | Refills: 0 | DISCHARGE

## 2024-02-06 RX ORDER — IBUPROFEN 200 MG
2 TABLET ORAL
Qty: 0 | Refills: 0 | DISCHARGE

## 2024-02-06 RX ORDER — SODIUM CHLORIDE 9 MG/ML
1000 INJECTION, SOLUTION INTRAVENOUS
Refills: 0 | Status: DISCONTINUED | OUTPATIENT
Start: 2024-02-10 | End: 2024-02-12

## 2024-02-06 NOTE — OB PST NOTE - HISTORY OF PRESENT ILLNESS
32 yo female presents to PST unit scheduled for repeat  section on 2/10/2024. ANAND 02/15/2024. She reports positive fetal movement, denies loss of fluid, vaginal bleeding or painful uterine contractions.

## 2024-02-06 NOTE — OB PST NOTE - PROBLEM SELECTOR PLAN 1
scheduled for repeat  section on 2/10/2024.   Preoperative instructions reviewed. No food 8 hours prior to , clear liquids up to 2.5 hours prior to procedure. Advised to drink regular Gatorade prior to admission. Pending labs Type & screen, CBC, UA. Last dose of PNV 7 days prior to surgery.

## 2024-02-06 NOTE — OB PST NOTE - NSICDXPASTSURGICALHX_GEN_ALL_CORE_FT
PAST SURGICAL HISTORY:  H/O breast biopsy 2021  findings benign    H/O  section     H/O dilation and curettage 2020    H/O wisdom tooth extraction

## 2024-02-06 NOTE — OB PST NOTE - NSHPREVIEWOFSYSTEMS_GEN_ALL_CORE
General: No fever, chills, sweating, anorexia, weight loss or weight gain. No polyphagia, polyurea, polydypsia, malaise, or fatigue    Skin: No rashes, itching, or dryness. No change in size/color of moles. No tumors, brittle nails, pitted nails, or hair loss    Breast: No tenderness, lumps, or nipple discharge      Ophthalmologic: No diplopia, photophobia, lacrimation, blurred Vision , or eye discharge    ENMT Symptoms: No hearing difficulty, ear pain, tinnitus, or vertigo. No sinus symptoms, nasal congestion, nasal   discharge, or nasal obstruction    Respiratory and Thorax: No wheezing, dyspnea, cough, hemoptysis, or pleuritic chest pPain     Cardiovascular: No chest pain, palpitations, dyspnea on exertion, orthopnea, paroxysmal nocturnal dyspnea,   peripheral edema, or claudication    Gastrointestinal: No nausea, vomiting, diarrhea, constipation, change in bowel habits, flatulence, abdominal pain, or melena    Genitourinary/ Pelvis: Pt. reports NSTs for elevated amniotic fluid, history of trace proteinuria s/p 24 hour urine collection .  No hematuria, renal colic, or flank pain.  No urine discoloration, incontinence, dysuria, or urinary hesitancy. Normal urinary frequency. No nocturia, abnormal vaginal bleeding, vaginal discharge, spotting, pelvic pain, or vaginal leakage    Musculoskeletal: No arthralgia, arthritis, joint swelling, muscle cramping, muscle weakness, neck pain, arm pain, or leg pain    Neurological: No transient paralysis, weakness, paresthesias, or seizures. No syncope, tremors, vertigo, loss of sensation, difficulty walking, loss of consciousness, hemiparesis, confusion, or facial palsy    Psychiatric: No suicidal ideation, depression, anxiety, insomnia, memory loss, paranoia, mood swings, agitation, hallucinations, or hyperactivity    Hematology: No gum bleeding, nose bleeding, or skin lumps    Lymphatic: No enlarged or tender lymph nodes. No extremity swelling    Endocrine: No heat or cold intolerance    Immunologic: No recurrent or persistent infections

## 2024-02-06 NOTE — OB PST NOTE - FALL HARM RISK - UNIVERSAL INTERVENTIONS
Bed in lowest position, wheels locked, appropriate side rails in place/Call bell, personal items and telephone in reach/Instruct patient to call for assistance before getting out of bed or chair/Non-slip footwear when patient is out of bed/[place] to call system/Physically safe environment - no spills, clutter or unnecessary equipment/Purposeful Proactive Rounding/Room/bathroom lighting operational, light cord in reach

## 2024-02-09 ENCOUNTER — TRANSCRIPTION ENCOUNTER (OUTPATIENT)
Age: 34
End: 2024-02-09

## 2024-02-10 ENCOUNTER — TRANSCRIPTION ENCOUNTER (OUTPATIENT)
Age: 34
End: 2024-02-10

## 2024-02-10 ENCOUNTER — INPATIENT (INPATIENT)
Facility: HOSPITAL | Age: 34
LOS: 1 days | Discharge: HOME | End: 2024-02-12
Attending: STUDENT IN AN ORGANIZED HEALTH CARE EDUCATION/TRAINING PROGRAM | Admitting: STUDENT IN AN ORGANIZED HEALTH CARE EDUCATION/TRAINING PROGRAM

## 2024-02-10 VITALS
HEART RATE: 83 BPM | DIASTOLIC BLOOD PRESSURE: 67 MMHG | HEIGHT: 59 IN | TEMPERATURE: 98 F | SYSTOLIC BLOOD PRESSURE: 110 MMHG | RESPIRATION RATE: 16 BRPM | WEIGHT: 145.06 LBS

## 2024-02-10 DIAGNOSIS — Z98.891 HISTORY OF UTERINE SCAR FROM PREVIOUS SURGERY: Chronic | ICD-10-CM

## 2024-02-10 DIAGNOSIS — Z98.890 OTHER SPECIFIED POSTPROCEDURAL STATES: Chronic | ICD-10-CM

## 2024-02-10 DIAGNOSIS — K08.409 PARTIAL LOSS OF TEETH, UNSPECIFIED CAUSE, UNSPECIFIED CLASS: Chronic | ICD-10-CM

## 2024-02-10 LAB
BASOPHILS # BLD AUTO: 0.01 K/UL — SIGNIFICANT CHANGE UP (ref 0–0.2)
BASOPHILS NFR BLD AUTO: 0.2 % — SIGNIFICANT CHANGE UP (ref 0–2)
EOSINOPHIL # BLD AUTO: 0.02 K/UL — SIGNIFICANT CHANGE UP (ref 0–0.5)
EOSINOPHIL NFR BLD AUTO: 0.3 % — SIGNIFICANT CHANGE UP (ref 0–6)
HCT VFR BLD CALC: 39.9 % — SIGNIFICANT CHANGE UP (ref 34.5–45)
HGB BLD-MCNC: 13.3 G/DL — SIGNIFICANT CHANGE UP (ref 11.5–15.5)
IANC: 3.98 K/UL — SIGNIFICANT CHANGE UP (ref 1.8–7.4)
IMM GRANULOCYTES NFR BLD AUTO: 0.7 % — SIGNIFICANT CHANGE UP (ref 0–0.9)
LYMPHOCYTES # BLD AUTO: 1.45 K/UL — SIGNIFICANT CHANGE UP (ref 1–3.3)
LYMPHOCYTES # BLD AUTO: 24.4 % — SIGNIFICANT CHANGE UP (ref 13–44)
MCHC RBC-ENTMCNC: 31.1 PG — SIGNIFICANT CHANGE UP (ref 27–34)
MCHC RBC-ENTMCNC: 33.3 GM/DL — SIGNIFICANT CHANGE UP (ref 32–36)
MCV RBC AUTO: 93.2 FL — SIGNIFICANT CHANGE UP (ref 80–100)
MONOCYTES # BLD AUTO: 0.44 K/UL — SIGNIFICANT CHANGE UP (ref 0–0.9)
MONOCYTES NFR BLD AUTO: 7.4 % — SIGNIFICANT CHANGE UP (ref 2–14)
NEUTROPHILS # BLD AUTO: 3.98 K/UL — SIGNIFICANT CHANGE UP (ref 1.8–7.4)
NEUTROPHILS NFR BLD AUTO: 67 % — SIGNIFICANT CHANGE UP (ref 43–77)
NRBC # BLD: 0 /100 WBCS — SIGNIFICANT CHANGE UP (ref 0–0)
NRBC # FLD: 0 K/UL — SIGNIFICANT CHANGE UP (ref 0–0)
PLATELET # BLD AUTO: 303 K/UL — SIGNIFICANT CHANGE UP (ref 150–400)
RBC # BLD: 4.28 M/UL — SIGNIFICANT CHANGE UP (ref 3.8–5.2)
RBC # FLD: 13.6 % — SIGNIFICANT CHANGE UP (ref 10.3–14.5)
RH IG SCN BLD-IMP: POSITIVE — SIGNIFICANT CHANGE UP
T PALLIDUM AB TITR SER: NEGATIVE — SIGNIFICANT CHANGE UP
WBC # BLD: 5.94 K/UL — SIGNIFICANT CHANGE UP (ref 3.8–10.5)
WBC # FLD AUTO: 5.94 K/UL — SIGNIFICANT CHANGE UP (ref 3.8–10.5)

## 2024-02-10 DEVICE — SURGICEL SNOW 2 X 4": Type: IMPLANTABLE DEVICE | Status: FUNCTIONAL

## 2024-02-10 RX ORDER — OXYCODONE HYDROCHLORIDE 5 MG/1
5 TABLET ORAL ONCE
Refills: 0 | Status: DISCONTINUED | OUTPATIENT
Start: 2024-02-10 | End: 2024-02-12

## 2024-02-10 RX ORDER — CITRIC ACID/SODIUM CITRATE 300-500 MG
30 SOLUTION, ORAL ORAL ONCE
Refills: 0 | Status: COMPLETED | OUTPATIENT
Start: 2024-02-10 | End: 2024-02-10

## 2024-02-10 RX ORDER — KETOROLAC TROMETHAMINE 30 MG/ML
30 SYRINGE (ML) INJECTION EVERY 6 HOURS
Refills: 0 | Status: COMPLETED | OUTPATIENT
Start: 2024-02-10 | End: 2024-02-11

## 2024-02-10 RX ORDER — IBUPROFEN 200 MG
600 TABLET ORAL EVERY 6 HOURS
Refills: 0 | Status: COMPLETED | OUTPATIENT
Start: 2024-02-10 | End: 2025-01-08

## 2024-02-10 RX ORDER — SODIUM CHLORIDE 9 MG/ML
1000 INJECTION, SOLUTION INTRAVENOUS ONCE
Refills: 0 | Status: DISCONTINUED | OUTPATIENT
Start: 2024-02-10 | End: 2024-02-12

## 2024-02-10 RX ORDER — LANOLIN
1 OINTMENT (GRAM) TOPICAL EVERY 6 HOURS
Refills: 0 | Status: DISCONTINUED | OUTPATIENT
Start: 2024-02-10 | End: 2024-02-12

## 2024-02-10 RX ORDER — ACETAMINOPHEN 500 MG
975 TABLET ORAL
Refills: 0 | Status: DISCONTINUED | OUTPATIENT
Start: 2024-02-10 | End: 2024-02-12

## 2024-02-10 RX ORDER — HEPARIN SODIUM 5000 [USP'U]/ML
5000 INJECTION INTRAVENOUS; SUBCUTANEOUS EVERY 12 HOURS
Refills: 0 | Status: DISCONTINUED | OUTPATIENT
Start: 2024-02-10 | End: 2024-02-12

## 2024-02-10 RX ORDER — FAMOTIDINE 10 MG/ML
20 INJECTION INTRAVENOUS ONCE
Refills: 0 | Status: COMPLETED | OUTPATIENT
Start: 2024-02-10 | End: 2024-02-10

## 2024-02-10 RX ORDER — SODIUM CHLORIDE 9 MG/ML
500 INJECTION, SOLUTION INTRAVENOUS
Refills: 0 | Status: DISCONTINUED | OUTPATIENT
Start: 2024-02-10 | End: 2024-02-12

## 2024-02-10 RX ORDER — OXYTOCIN 10 UNIT/ML
333.33 VIAL (ML) INJECTION
Qty: 20 | Refills: 0 | Status: DISCONTINUED | OUTPATIENT
Start: 2024-02-10 | End: 2024-02-10

## 2024-02-10 RX ORDER — CHLORHEXIDINE GLUCONATE 213 G/1000ML
1 SOLUTION TOPICAL DAILY
Refills: 0 | Status: DISCONTINUED | OUTPATIENT
Start: 2024-02-10 | End: 2024-02-10

## 2024-02-10 RX ORDER — SODIUM CHLORIDE 9 MG/ML
1000 INJECTION, SOLUTION INTRAVENOUS ONCE
Refills: 0 | Status: COMPLETED | OUTPATIENT
Start: 2024-02-10 | End: 2024-02-10

## 2024-02-10 RX ORDER — MAGNESIUM HYDROXIDE 400 MG/1
30 TABLET, CHEWABLE ORAL
Refills: 0 | Status: DISCONTINUED | OUTPATIENT
Start: 2024-02-10 | End: 2024-02-12

## 2024-02-10 RX ORDER — SODIUM CHLORIDE 9 MG/ML
1000 INJECTION, SOLUTION INTRAVENOUS
Refills: 0 | Status: DISCONTINUED | OUTPATIENT
Start: 2024-02-10 | End: 2024-02-10

## 2024-02-10 RX ORDER — DIPHENHYDRAMINE HCL 50 MG
25 CAPSULE ORAL EVERY 6 HOURS
Refills: 0 | Status: DISCONTINUED | OUTPATIENT
Start: 2024-02-10 | End: 2024-02-12

## 2024-02-10 RX ORDER — SIMETHICONE 80 MG/1
80 TABLET, CHEWABLE ORAL EVERY 4 HOURS
Refills: 0 | Status: DISCONTINUED | OUTPATIENT
Start: 2024-02-10 | End: 2024-02-12

## 2024-02-10 RX ORDER — OXYTOCIN 10 UNIT/ML
333.33 VIAL (ML) INJECTION
Qty: 20 | Refills: 0 | Status: DISCONTINUED | OUTPATIENT
Start: 2024-02-10 | End: 2024-02-12

## 2024-02-10 RX ORDER — SODIUM CHLORIDE 9 MG/ML
500 INJECTION, SOLUTION INTRAVENOUS ONCE
Refills: 0 | Status: DISCONTINUED | OUTPATIENT
Start: 2024-02-10 | End: 2024-02-12

## 2024-02-10 RX ORDER — OXYCODONE HYDROCHLORIDE 5 MG/1
5 TABLET ORAL
Refills: 0 | Status: DISCONTINUED | OUTPATIENT
Start: 2024-02-10 | End: 2024-02-12

## 2024-02-10 RX ORDER — TETANUS TOXOID, REDUCED DIPHTHERIA TOXOID AND ACELLULAR PERTUSSIS VACCINE, ADSORBED 5; 2.5; 8; 8; 2.5 [IU]/.5ML; [IU]/.5ML; UG/.5ML; UG/.5ML; UG/.5ML
0.5 SUSPENSION INTRAMUSCULAR ONCE
Refills: 0 | Status: DISCONTINUED | OUTPATIENT
Start: 2024-02-10 | End: 2024-02-12

## 2024-02-10 RX ADMIN — SODIUM CHLORIDE 2000 MILLILITER(S): 9 INJECTION, SOLUTION INTRAVENOUS at 08:45

## 2024-02-10 RX ADMIN — CHLORHEXIDINE GLUCONATE 1 APPLICATION(S): 213 SOLUTION TOPICAL at 07:25

## 2024-02-10 RX ADMIN — SODIUM CHLORIDE 75 MILLILITER(S): 9 INJECTION, SOLUTION INTRAVENOUS at 11:36

## 2024-02-10 RX ADMIN — HEPARIN SODIUM 5000 UNIT(S): 5000 INJECTION INTRAVENOUS; SUBCUTANEOUS at 16:19

## 2024-02-10 RX ADMIN — FAMOTIDINE 20 MILLIGRAM(S): 10 INJECTION INTRAVENOUS at 08:45

## 2024-02-10 RX ADMIN — Medication 975 MILLIGRAM(S): at 14:15

## 2024-02-10 RX ADMIN — Medication 975 MILLIGRAM(S): at 15:29

## 2024-02-10 RX ADMIN — Medication 30 MILLIGRAM(S): at 16:43

## 2024-02-10 RX ADMIN — Medication 1000 MILLIUNIT(S)/MIN: at 11:35

## 2024-02-10 RX ADMIN — Medication 30 MILLILITER(S): at 08:46

## 2024-02-10 RX ADMIN — Medication 975 MILLIGRAM(S): at 21:00

## 2024-02-10 RX ADMIN — Medication 0.2 MILLIGRAM(S): at 20:00

## 2024-02-10 RX ADMIN — Medication 0.2 MILLIGRAM(S): at 15:28

## 2024-02-10 RX ADMIN — Medication 0.2 MILLIGRAM(S): at 11:36

## 2024-02-10 RX ADMIN — Medication 975 MILLIGRAM(S): at 20:01

## 2024-02-10 NOTE — DISCHARGE NOTE OB - PERSISTENT HEADACHE, BLURRED VISION
150mg of medroxyprogesterone administered IM to right deltoid per patient request. Patient tolerated well. No pain or discomfort noted. Advised to wait 15 minutes after in clinic. Patient verbalized understanding. Next injection scheduled.       
Statement Selected

## 2024-02-10 NOTE — OB PROVIDER DELIVERY SUMMARY - NSPROVIDERDELIVERYNOTE_OBGYN_ALL_OB_FT
r/c/s for breech. Male infant. Apgars 7/8. Weight [id number]. Hysterotomy closed in a single layer with excellent hemostasis. QBl 365. r/c/s for breech. Male infant. Apgars 7/8. Weight [id number]. Hysterotomy closed in a single layer with excellent hemostasis. QBl 415.    Dictation #: [id number]    April Newberry PGY-4,   w/ Dr. Staton

## 2024-02-10 NOTE — OB RN PATIENT PROFILE - FETAL SONOGRAM : OTHERS
Patient is here for appointment with Dr. Berry. Patient is on oral Piqray. Patient is monitoring her blood glucose daily. (Please see below). No new issues to report.   Diagnosis:   1. Metastatic breast cancer (CMD)    2. Frequent headaches       Regimen: Piqray    Vital Signs:   ONC OP Encounter Vitals  BP: (!) 150/75  Heart Rate: 71  Resp: 14  Temp: 98.1 °F (36.7 °C)  Temp src: Oral  SpO2: 97 %  Weight: 89 kg (196 lb 3.4 oz)  Pain Score:  0      Allergies:  ALLERGIES:  No Known Allergies     ECOG: ECOG Performance Status: 0    Oral Chemo Follow-Up:  Oral Chemo Cycle/Start Date  Enter confirmed patient start date of cycle: 02/04/23  Oral medications?: Yes  alpelisib (PIQRAY): 300mg    Indication & Administration Schedule - Follow Up Education  Indication: Patient Understands & Able to State Diagnosis & Goal of Treatment?: Yes  Administration Schedule: Dose/Frequency Changed or Medication Discontinued by the Provider?: No  Total Daily Dose: : 300mg  Administration Schedule: Patient is taking the medication appropriately based on assessment of Strength of Medication, Total Daily Dose, Administration Schedule & Special Instructions (i.e. do not break, crush, chew; with/without food?, need to separate from other medications?, following unusual schedule?, other special instruction?) Noted During Initial Education or Follow-Up: Yes    Toxicity Management - Follow Up Education  Toxicity Assessment Completed:: Yes    Adherence Assessment - Follow Up Education  Patient is Currently Adherent with Oral Chemotherapy : Yes  Risk Factors for Non-Adherence Identified: No      Oral Chemo Assessment:      Distress Screening: Is this day one of cycle or a new regimen? No No, patient did not indicate any new concerns. Refer to most recent PHQ2/9 score    Toxicity Assessment:  Adverse Events?  Adverse Events: No    Auditory/Ear  Assessment: Yes (Within Defined Limits)    Cardiac General  Assessment: Yes (w/ Exceptions to  WDL)  Hypertension: Grade 2    Dermatology/Skin  Assessment: Yes (w/ Exceptions to WDL)  Pruritus: Grade 1    Constitutional  Assessment: Yes (w/ Exceptions to WDL)  Fatigue: Grade 1    Endocrine  Assessment: Yes (w/ Exceptions to WDL)  Hot Flashes: Grade 1    Gastrointestinal  Assessment: Yes (w/ Exceptions to WDL)  Nausea: Grade 1    Hemorrhage/Bleeding  Assessment: Yes (Within Defined Limits)    Infection  Assessment: Yes (Within Defined Limits)    Lymphatics  Assessment: Yes (Within Defined Limits)    Musculoskeletal  Assessment: Yes (Within Defined Limits)    Neurology  Assessment: Yes (w/ Exceptions to WDL)  Dizziness: Grade 1    Ocular  Assessment: Yes (Within Defined Limits)    Pain  Assessment: Yes (Within Defined Limits)    Pulmonary/Upper Respiratory  Assessment: Yes (Within Defined Limits)    Genitourinary  Assessment: Yes (Within Defined Limits)      Education provided No new instructions needed    Patient instructed to call the office with any questions or concerns.    jasmin

## 2024-02-10 NOTE — DISCHARGE NOTE OB - CARE PROVIDER_API CALL
Erin Staton  Obstetrics and Gynecology  [address] [place] [id number]  Phone: [phone]  Fax: [phone]  Established Patient  Follow Up Time: 2 weeks

## 2024-02-10 NOTE — OB RN DELIVERY SUMMARY - NSSELHIDDEN_OBGYN_ALL_OB_FT
[NS_DeliveryAttending1_OBGYN_ALL_OB_FT:[id number]=],[NS_DeliveryAssist1_OBGYN_ALL_OB_FT:[id number]=],[NS_DeliveryRN_OBGYN_ALL_OB_FT:[id number]]

## 2024-02-10 NOTE — DISCHARGE NOTE OB - PATIENT PORTAL LINK FT
You can access the FollowMyHealth Patient Portal offered by [facility] by registering at the following website: [email or web address]. By joining [facility]’s FollowMyHealth portal, you will also be able to view your health information using other applications (apps) compatible with our system.

## 2024-02-10 NOTE — OB POSTPARTUM EVENT NOTE - NS_EVENTSUMMARY1_OBGYN_ALL_OB_FT
Called to patient's bedside by OLIVER Martinez due to patient having additional passage of clots in PACU. Patient denies dizziness, chest pain, SOB.

## 2024-02-10 NOTE — OB RN DELIVERY SUMMARY - NS_SEPSISRSKCALC_OBGYN_ALL_OB_FT
EOS calculated successfully. EOS Risk Factor: 0.5/1000 live births ([facility] [place] national incidence); GA=39w2d; Temp=97.9; ROM=0.017; GBS='Unknown'; Antibiotics='No antibiotics or any antibiotics < 2 hrs prior to birth'

## 2024-02-10 NOTE — OB RN PREOPERATIVE CHECKLIST - DENTURES
The patient presents with left shoulder pain which started Monday morning upon waking. He denies any known injury or new repetitive trauma. Throughout the week he has been treating with NSAIDs and thermal treatment mostly ice. This provided some relief and he thought the issue was resolving but then it returned today. He denies numbness and tingling. The pain does not radiate. He denies any neck pain or stiffness. No weakness or dropping of objects. The history is provided by the patient. Shoulder Pain  This is a new problem. The current episode started more than 2 days ago. The problem occurs constantly. The problem has been gradually improving. Exacerbated by: Palpation, movement. States he cannot raise it above the level of the shoulder because of pain. The symptoms are relieved by ice and NSAIDs (Temporary relief with NSAIDs.). The treatment provided mild relief. Past Medical History:   Diagnosis Date    Hypertension         Past Surgical History:   Procedure Laterality Date    HX ACL RECONSTRUCTION Left          History reviewed. No pertinent family history. Social History     Socioeconomic History    Marital status:      Spouse name: Not on file    Number of children: Not on file    Years of education: Not on file    Highest education level: Not on file   Occupational History    Not on file   Tobacco Use    Smoking status: Never Smoker    Smokeless tobacco: Never Used   Substance and Sexual Activity    Alcohol use:  Yes     Alcohol/week: 1.7 standard drinks     Types: 2 Shots of liquor per week    Drug use: Not on file    Sexual activity: Not on file   Other Topics Concern    Not on file   Social History Narrative    Not on file     Social Determinants of Health     Financial Resource Strain:     Difficulty of Paying Living Expenses:    Food Insecurity:     Worried About Running Out of Food in the Last Year:     [address] in the Last Year:    Transportation Needs:  Lack of Transportation (Medical):  Lack of Transportation (Non-Medical):    Physical Activity:     Days of Exercise per Week:     Minutes of Exercise per Session:    Stress:     Feeling of Stress :    Social Connections:     Frequency of Communication with Friends and Family:     Frequency of Social Gatherings with Friends and Family:     Attends Buddhist Services:     Active Member of Clubs or Organizations:     Attends Club or Organization Meetings:     Marital Status:    Intimate Partner Violence:     Fear of Current or Ex-Partner:     Emotionally Abused:     Physically Abused:     Sexually Abused: ALLERGIES: Patient has no known allergies. Review of Systems   Constitutional: Negative for activity change and appetite change. Musculoskeletal: Positive for myalgias. Negative for arthralgias, back pain, joint swelling, neck pain and neck stiffness. All other systems reviewed and are negative. Vitals:    06/12/21 1826   BP: (!) 153/104   Pulse: 85   Resp: 16   Temp: 97.8 °F (36.6 °C)   SpO2: 97%   Weight: 235 lb (106.6 kg)   Height: 5' 9\" (1.753 m)       Physical Exam  Constitutional:       General: He is not in acute distress. Appearance: He is well-developed. HENT:      Head: Normocephalic. Musculoskeletal:      Left shoulder: Tenderness present. No swelling, deformity or bony tenderness. Decreased range of motion (Limited by pain). Normal strength. Thoracic back: Normal.      Lumbar back: Normal.      Comments: 5/5 hands wrists forearms elbows and shoulders. Neurological:      Mental Status: He is alert and oriented to person, place, and time. Psychiatric:         Behavior: Behavior is cooperative. MDM    ICD-10-CM ICD-9-CM    1.  Acute pain of left shoulder  M25.512 719.41 predniSONE (DELTASONE) 20 mg tablet      cyclobenzaprine (FLEXERIL) 10 mg tablet     Medications Ordered Today   Medications    predniSONE (DELTASONE) 20 mg tablet Sig: Take 40 mg by mouth daily (with breakfast) for 5 days. Dispense:  10 Tablet     Refill:  0    cyclobenzaprine (FLEXERIL) 10 mg tablet     Sig: Take 1 Tablet by mouth three (3) times daily as needed for Muscle Spasm(s). Dispense:  15 Tablet     Refill:  0     No results found for any visits on 06/12/21. The patients condition was discussed with the patient and they understand. The patient is to follow up with primary care doctor. If signs and symptoms become worse the pt is to go to the ER. The patient is to take medications as prescribed. Using principles of shared decision making, discussed with patient options for treatment. He could continue NSAIDs since he has had some relief with that but it is not lasting, discussed oral steroids as well as muscle relaxers. Also educated on thermal therapies ice and heat and when to use each. Patient in agreement with trying oral steroid and Flexeril. Educated on the side effect of both medications.     Procedures no

## 2024-02-10 NOTE — OB POSTPARTUM EVENT NOTE - NS_EVENTPTSUMMARY1_OBGYN_ALL_OB_FT
Vital Signs Last 24 Hrs  T(C): 36.6 (10 Feb 2024 09:25), Max: 36.6 (10 Feb 2024 06:46)  T(F): 97.88 (10 Feb 2024 07:20), Max: 97.9 (10 Feb 2024 06:46)  HR: 92 (10 Feb 2024 09:25) (83 - 92)  BP: 110/67 (10 Feb 2024 09:25) (110/67 - 110/67)  BP(mean): --  RR: 20 (10 Feb 2024 09:25) (16 - 20)  SpO2: --    Parameters below as of 10 Feb 2024 06:46  Patient On (Oxygen Delivery Method): room air    General: well appearing, NAD   Pulmonary: breathing comfortably on room air   Cardiovascular: normal rate, extremities well perfused   : bimanual exam attempted but cervix only 1 cm and unable to reach fundus. Dr. Staton called to bedside and also evacuated clot but unable to reach fundus.   TAUS: clot visualized at fundus, no color flow

## 2024-02-10 NOTE — DISCHARGE NOTE OB - MEDICATION SUMMARY - MEDICATIONS TO TAKE
I will START or STAY ON the medications listed below when I get home from the hospital:    calcium supplement  -- 600 mg tablet by mouth once daily as needed  -- Indication: For H/O  section    Magnesium  -- 500 mg tablet by mouth once daily  -- Indication: For H/O  section    Prenatal Multivitamins oral tablet  -- 1 tab(s) by mouth once a day last dose 2/10/2024.  -- Indication: For H/O  section

## 2024-02-10 NOTE — OB PROVIDER H&P - NSWIC_OBGYN_ALL_OB
Reason for Disposition   All other earaches (Exceptions: earache lasting < 1 hour, and earache from air travel)    Answer Assessment - Initial Assessment Questions  1. LOCATION: \"Which ear is involved? \"      Left ear    2. ONSET: \"When did the ear start hurting\"   1 week    3. SEVERITY: \"How bad is the pain? \"  (Scale 1-10; mild, moderate or severe)    - MILD (1-3): doesn't interfere with normal activities     - MODERATE (4-7): interferes with normal activities or awakens from sleep     - SEVERE (8-10): excruciating pain, unable to do any normal activities    mild    4. URI SYMPTOMS: \"Do you have a runny nose or cough? \"  Not now , but did last week      5. FEVER: \"Do you have a fever? \" If so, ask: \"What is your temperature, how was it measured, and when did it start? \"  States no    6. CAUSE: \"Have you been swimming recently? \", \"How often do you use Q-TIPS? \", \"Have you had any recent air travel or scuba diving? \"  No    7. OTHER SYMPTOMS: \"Do you have any other symptoms? \" (e.g., headache, stiff neck, dizziness, vomiting, runny nose, decreased hearing)  Headache off and on, dizziness    8. PREGNANCY: \"Is there any chance you are pregnant? \" \"When was your last menstrual period? \"      No, took test at MD office    Protocols used: [facility]    Attention Provider: Thank you for allowing me to participate in the care of your patient. The patient was connected to triage in response to information provided to the [facility]. Please do not respond through this encounter as the response is not directed to a shared pool.       call transferred to [facility] Dawn Benton No

## 2024-02-10 NOTE — OB RN INTRAOPERATIVE NOTE - NSSELHIDDEN_OBGYN_ALL_OB_FT
[NS_DeliveryAttending1_OBGYN_ALL_OB_FT:[id number]],[NS_DeliveryAssist1_OBGYN_ALL_OB_FT:[id number]=],[NS_DeliveryRN_OBGYN_ALL_OB_FT:[id number]]

## 2024-02-10 NOTE — OB PROVIDER H&P - ASSESSMENT
A&P:   Labor: admit for scheduled rLTCS  -routine labs  -EFM/toco  -NPO, IV hydration  -Pepcid/reglan/bicitra  Fetal: cat 1 tracing, fetal status reassuring  GBS: neg      Discussed with Dr. Brionna Newberry PGY-4

## 2024-02-10 NOTE — OB POSTPARTUM EVENT NOTE - NS_EVENTFINDINGS1_OBGYN_ALL_OB_FT
33 y.o.  POD#0 from rLTCS, initial . Patient with additional bleeding in PACU of 366cc. Cytotec VT administered as well as methergine IM. Due to not being dilated enough unable to do full bimanual exam. Patient counselled that she will likely pass more clots.     - Methergine series   - continue to monitor in PACU   - Evaluate once patient passes more blood 33 y.o.  POD#0 from rLTCS, initial . Patient with additional bleeding in PACU of 366cc. Cytotec MI administered as well as methergine IM. Due to not being dilated enough unable to do full bimanual exam, patient counselled that she will likely have more bleeding following medication administration.    - Methergine series   - continue to monitor in PACU     seen w/ April Neil PGY-4

## 2024-02-10 NOTE — OB PROVIDER DELIVERY SUMMARY - NSSELHIDDEN_OBGYN_ALL_OB_FT
[NS_DeliveryAttending1_OBGYN_ALL_OB_FT:[id number]],[NS_DeliveryAssist1_OBGYN_ALL_OB_FT:[id number]=]

## 2024-02-10 NOTE — OB NEONATOLOGY/PEDIATRICIAN DELIVERY SUMMARY - NSPEDSNEONOTESA_OBGYN_ALL_OB_FT
Pediatrician called to delivery for repeat C/S with breech presentation. Male infant born at 39 2/7 wks via  to a 34 y/o  blood type B+ mother. Maternal history of GERD, AMA. Prenatal history of failed external cephalic version. Prenatal labs nr/immune/-, GBS +/- on . AROM at 0939 on 2/10 with clear fluids. EOS score 0.02, highest maternal temperature 39.6. Baby emerged vigorous, crying. Cord clamping delayed ?sec. Infant was brought to radiant warmer and warmed, dried, stimulated and suctioned. HR>100. Baby had increased WOB with retractions and low SpO2, so CPAP started at 6 mins of life. Received 30% FiO2 at 6-8 MOL, otherwise 21%. CPAP stopped after 25 mins due to normal respiratory effort and improved sats. APGARS of 7/8 for resp and color. Mom is initiating breast feeding. Consents to Hepatitis B vaccination. Desires for infant to be circumcised. Pediatrician called to delivery for repeat C/S with breech presentation. Male infant born at 39 2/7 wks via  to a 36 y/o  blood type B+ mother. Maternal history of GERD, AMA. Prenatal history of failed external cephalic version. Prenatal labs nr/immune/-, GBS +/- on . AROM at 0939 on 2/10 with clear fluids. EOS score 0.02, highest maternal temperature 39.6. Baby emerged vigorous, crying. Infant was brought to radiant warmer and warmed, dried, stimulated and suctioned. HR>100. Baby had increased WOB with retractions and low SpO2, so CPAP started at 6 mins of life. Received 30% FiO2 at 6-8 MOL, otherwise 21%. CPAP stopped after 25 mins due to normal respiratory effort and improved sats. APGARS of 7/8 for resp and color. Mom is initiating breast feeding. Consents to Hepatitis B vaccination. Desires for infant to be circumcised.

## 2024-02-10 NOTE — OB RN PATIENT PROFILE - FALL HARM RISK - FALL HARM RISK
pt states having right knee pain  had 4 sessions of PT, didn't help. now has pain from R thigh to lower leg, denies falling but walks slowly and has pain/Other

## 2024-02-10 NOTE — PRE-ANESTHESIA EVALUATION ADULT - TEMPERATURE IN CELSIUS (DEGREES C)
Please return to the ER for severe vomiting, lethargy, labored breathing, or other major concerns.   Motrin and tylenol as needed for fever.   Ondansetron as needed for nausea and vomiting.   36.6

## 2024-02-10 NOTE — OB PROVIDER H&P - NSHPPHYSICALEXAM_GEN_ALL_CORE
VS:  Vital Signs Last 24 Hrs  T(C): 36.6 (10 Feb 2024 07:20), Max: 36.6 (10 Feb 2024 06:46)  T(F): 97.88 (10 Feb 2024 07:20), Max: 97.9 (10 Feb 2024 06:46)  HR: 92 (10 Feb 2024 07:00) (83 - 92)  BP: 110/67 (10 Feb 2024 07:00) (110/67 - 110/67)  BP(mean): --  RR: 20 (10 Feb 2024 07:20) (16 - 20)  SpO2: --    Parameters below as of 10 Feb 2024 06:46  Patient On (Oxygen Delivery Method): room air    GA: well appearing, NAD   Cards: extremities well perfused  Pulm: breathing comfortably on room air   EFH: baseline 150, moderate variability, + accelerations, no decelerations  Queets: Irregular   VE: deferred  TAUS: VS:  Vital Signs Last 24 Hrs  T(C): 36.6 (10 Feb 2024 07:20), Max: 36.6 (10 Feb 2024 06:46)  T(F): 97.88 (10 Feb 2024 07:20), Max: 97.9 (10 Feb 2024 06:46)  HR: 92 (10 Feb 2024 07:00) (83 - 92)  BP: 110/67 (10 Feb 2024 07:00) (110/67 - 110/67)  BP(mean): --  RR: 20 (10 Feb 2024 07:20) (16 - 20)  SpO2: --    Parameters below as of 10 Feb 2024 06:46  Patient On (Oxygen Delivery Method): room air    GA: well appearing, NAD   Cards: extremities well perfused  Pulm: breathing comfortably on room air   EFH: baseline 150, moderate variability, + accelerations, no decelerations  Addis: Irregular   VE: deferred  TAUS: breech presentation

## 2024-02-10 NOTE — OB PROVIDER H&P - NSLOWPPHRISK_OBGYN_A_OB
Tucker Pregnancy/Less than or equal to 4 previous vaginal births/No history of postpartum hemorrhage/No other PPH risks indicated

## 2024-02-10 NOTE — OB PROVIDER H&P - NS_OBGYNHISTORY_OBGYN_ALL_OB_FT
OBHx:  - pLTCS breech presentation 2021: [id number]  - mAB s/p D&C   - PNC: reports having proteinuria, s/p 24 hr urine that was normal. Otherwise patient reports increasing pain in RLE, seen by ortho + PT   GynHx: denies any fibroids, ovarian cysts, abnl pap smears, STIs

## 2024-02-10 NOTE — DISCHARGE NOTE OB - NS MD DC FALL RISK RISK
For information on Fall & Injury Prevention, visit: [email or web address] OR  [email or web address] OR  https://www.cdc.gov/steadi/patient.html

## 2024-02-10 NOTE — OB RN PATIENT PROFILE - FALL HARM RISK - HARM RISK INTERVENTIONS

## 2024-02-11 LAB
BASOPHILS # BLD AUTO: 0.15 K/UL — SIGNIFICANT CHANGE UP (ref 0–0.2)
BASOPHILS NFR BLD AUTO: 0.9 % — SIGNIFICANT CHANGE UP (ref 0–2)
EOSINOPHIL # BLD AUTO: 0 K/UL — SIGNIFICANT CHANGE UP (ref 0–0.5)
EOSINOPHIL NFR BLD AUTO: 0 % — SIGNIFICANT CHANGE UP (ref 0–6)
HCT VFR BLD CALC: 32.9 % — LOW (ref 34.5–45)
HGB BLD-MCNC: 11.1 G/DL — LOW (ref 11.5–15.5)
IANC: 12 K/UL — HIGH (ref 1.8–7.4)
LYMPHOCYTES # BLD AUTO: 13.4 % — SIGNIFICANT CHANGE UP (ref 13–44)
LYMPHOCYTES # BLD AUTO: 2.16 K/UL — SIGNIFICANT CHANGE UP (ref 1–3.3)
MANUAL SMEAR VERIFICATION: SIGNIFICANT CHANGE UP
MCHC RBC-ENTMCNC: 31 PG — SIGNIFICANT CHANGE UP (ref 27–34)
MCHC RBC-ENTMCNC: 33.7 GM/DL — SIGNIFICANT CHANGE UP (ref 32–36)
MCV RBC AUTO: 91.9 FL — SIGNIFICANT CHANGE UP (ref 80–100)
MONOCYTES # BLD AUTO: 0.71 K/UL — SIGNIFICANT CHANGE UP (ref 0–0.9)
MONOCYTES NFR BLD AUTO: 4.4 % — SIGNIFICANT CHANGE UP (ref 2–14)
NEUTROPHILS # BLD AUTO: 13.11 K/UL — HIGH (ref 1.8–7.4)
NEUTROPHILS NFR BLD AUTO: 78.6 % — HIGH (ref 43–77)
NEUTS BAND # BLD: 2.7 % — SIGNIFICANT CHANGE UP (ref 0–6)
PLAT MORPH BLD: NORMAL — SIGNIFICANT CHANGE UP
PLATELET # BLD AUTO: 255 K/UL — SIGNIFICANT CHANGE UP (ref 150–400)
PLATELET COUNT - ESTIMATE: NORMAL — SIGNIFICANT CHANGE UP
RBC # BLD: 3.58 M/UL — LOW (ref 3.8–5.2)
RBC # FLD: 13.2 % — SIGNIFICANT CHANGE UP (ref 10.3–14.5)
RBC BLD AUTO: NORMAL — SIGNIFICANT CHANGE UP
SMUDGE CELLS # BLD: PRESENT — SIGNIFICANT CHANGE UP
WBC # BLD: 16.12 K/UL — HIGH (ref 3.8–10.5)
WBC # FLD AUTO: 16.12 K/UL — HIGH (ref 3.8–10.5)

## 2024-02-11 RX ORDER — DIPHENHYDRAMINE HCL 50 MG
25 CAPSULE ORAL ONCE
Refills: 0 | Status: COMPLETED | OUTPATIENT
Start: 2024-02-11 | End: 2024-02-11

## 2024-02-11 RX ORDER — IBUPROFEN 200 MG
600 TABLET ORAL EVERY 6 HOURS
Refills: 0 | Status: DISCONTINUED | OUTPATIENT
Start: 2024-02-11 | End: 2024-02-12

## 2024-02-11 RX ADMIN — HEPARIN SODIUM 5000 UNIT(S): 5000 INJECTION INTRAVENOUS; SUBCUTANEOUS at 04:13

## 2024-02-11 RX ADMIN — Medication 0.2 MILLIGRAM(S): at 04:12

## 2024-02-11 RX ADMIN — Medication 975 MILLIGRAM(S): at 04:12

## 2024-02-11 RX ADMIN — Medication 975 MILLIGRAM(S): at 17:59

## 2024-02-11 RX ADMIN — Medication 975 MILLIGRAM(S): at 11:48

## 2024-02-11 RX ADMIN — Medication 975 MILLIGRAM(S): at 12:18

## 2024-02-11 RX ADMIN — Medication 30 MILLIGRAM(S): at 00:12

## 2024-02-11 RX ADMIN — Medication 30 MILLIGRAM(S): at 08:35

## 2024-02-11 RX ADMIN — Medication 975 MILLIGRAM(S): at 17:29

## 2024-02-11 RX ADMIN — Medication 600 MILLIGRAM(S): at 15:17

## 2024-02-11 RX ADMIN — Medication 30 MILLIGRAM(S): at 08:14

## 2024-02-11 RX ADMIN — SIMETHICONE 80 MILLIGRAM(S): 80 TABLET, CHEWABLE ORAL at 08:14

## 2024-02-11 RX ADMIN — Medication 600 MILLIGRAM(S): at 14:47

## 2024-02-11 RX ADMIN — Medication 25 MILLIGRAM(S): at 08:14

## 2024-02-11 RX ADMIN — Medication 600 MILLIGRAM(S): at 21:19

## 2024-02-11 RX ADMIN — Medication 0.2 MILLIGRAM(S): at 08:14

## 2024-02-11 RX ADMIN — Medication 600 MILLIGRAM(S): at 21:49

## 2024-02-11 RX ADMIN — SIMETHICONE 80 MILLIGRAM(S): 80 TABLET, CHEWABLE ORAL at 14:47

## 2024-02-11 RX ADMIN — MAGNESIUM HYDROXIDE 30 MILLILITER(S): 400 TABLET, CHEWABLE ORAL at 17:28

## 2024-02-11 RX ADMIN — Medication 0.2 MILLIGRAM(S): at 00:11

## 2024-02-11 RX ADMIN — HEPARIN SODIUM 5000 UNIT(S): 5000 INJECTION INTRAVENOUS; SUBCUTANEOUS at 17:29

## 2024-02-11 NOTE — PROGRESS NOTE ADULT - ASSESSMENT
Patient is POD1 from rLTCS c/b QBL of 415+366 s/p cytotec SD, currently on Methergine series. Patient is meeting all postpartum milestones. Vital signs are stable and physical exam is unremarkable.  - Increase ambulation  - Continue regular diet  - Page is removed  - F/u POD#1 CBC    BHAVIK Davis, PGY-1 Patient is POD1 from rLTCS c/b QBL of 415+366 s/p cytotec AL, currently on Methergine series. Patient is meeting all postpartum milestones. Vital signs are stable and physical exam is unremarkable.  - Increase ambulation  - Continue regular diet  - Page is removed, voiding spontaneously  - Dressing removed, incision c/d/i  - F/u POD#1 CBC    BHAVIK Davis, PGY-1

## 2024-02-11 NOTE — PROGRESS NOTE ADULT - SUBJECTIVE AND OBJECTIVE BOX
Patient seen and examined at bedside, no acute overnight events. No acute complaints, pain well controlled. Patient is ambulating and tolerating regular diet. Has not yet passed flatus. Page is removed. Denies CP, SOB, N/V, HA, blurred vision, epigastric pain.    Vital Signs Last 24 Hours  T(C): 37.5 (02-11-24 @ 00:00), Max: 37.5 (02-10-24 @ 17:48)  HR: 94 (02-11-24 @ 00:00) (76 - 109)  BP: 110/68 (02-11-24 @ 00:00) (103/53 - 158/82)  RR: 18 (02-11-24 @ 00:00) (13 - 22)  SpO2: 97% (02-11-24 @ 00:00) (93% - 100%)    I&O's Summary    10 Feb 2024 07:01  -  11 Feb 2024 03:50  --------------------------------------------------------  IN: 3850 mL / OUT: 3431 mL / NET: 419 mL        Physical Exam:  General: NAD  Abdomen: Soft, non-tender, non-distended, fundus firm  Incision: Pfannenstiel incision CDI, subcuticular suture closure  Pelvic: Lochia wnl    Labs:    Blood Type: B Positive  Antibody Screen: Negative  RPR: Negative               13.3   5.94  )-----------( 303      ( 02-10 @ 07:00 )             39.9                12.5   7.29  )-----------( 292      ( 02-06 @ 16:30 )             36.8         MEDICATIONS  (STANDING):  acetaminophen     Tablet .. 975 milliGRAM(s) Oral <User Schedule>  diphenhydrAMINE 25 milliGRAM(s) Oral once  diphtheria/tetanus/pertussis (acellular) Vaccine (Adacel) 0.5 milliLiter(s) IntraMuscular once  heparin   Injectable 5000 Unit(s) SubCutaneous every 12 hours  ibuprofen  Tablet. 600 milliGRAM(s) Oral every 6 hours  ketorolac   Injectable 30 milliGRAM(s) IV Push every 6 hours  lactated ringers Bolus 1000 milliLiter(s) IV Bolus once  lactated ringers Bolus 500 milliLiter(s) IV Bolus once  lactated ringers Bolus 1000 milliLiter(s) IV Bolus once  lactated ringers Bolus 500 milliLiter(s) IV Bolus once  lactated ringers. 1000 milliLiter(s) (30 mL/Hr) IV Continuous <Continuous>  lactated ringers. 500 milliLiter(s) (125 mL/Hr) IV Continuous <Continuous>  methylergonovine 0.2 milliGRAM(s) Oral every 4 hours  oxytocin Infusion 333.333 milliUNIT(s)/Min (1000 mL/Hr) IV Continuous <Continuous>    MEDICATIONS  (PRN):  diphenhydrAMINE 25 milliGRAM(s) Oral every 6 hours PRN Pruritus  lanolin Ointment 1 Application(s) Topical every 6 hours PRN Sore Nipples  magnesium hydroxide Suspension 30 milliLiter(s) Oral two times a day PRN Constipation  oxyCODONE    IR 5 milliGRAM(s) Oral every 3 hours PRN Moderate to Severe Pain (4-10)  oxyCODONE    IR 5 milliGRAM(s) Oral once PRN Moderate to Severe Pain (4-10)  simethicone 80 milliGRAM(s) Chew every 4 hours PRN Gas

## 2024-02-12 VITALS
DIASTOLIC BLOOD PRESSURE: 77 MMHG | HEART RATE: 95 BPM | OXYGEN SATURATION: 100 % | RESPIRATION RATE: 18 BRPM | TEMPERATURE: 98 F | SYSTOLIC BLOOD PRESSURE: 131 MMHG

## 2024-02-12 LAB
HCT VFR BLD CALC: 31 % — LOW (ref 34.5–45)
HGB BLD-MCNC: 10.2 G/DL — LOW (ref 11.5–15.5)
MCHC RBC-ENTMCNC: 31 PG — SIGNIFICANT CHANGE UP (ref 27–34)
MCHC RBC-ENTMCNC: 32.9 GM/DL — SIGNIFICANT CHANGE UP (ref 32–36)
MCV RBC AUTO: 94.2 FL — SIGNIFICANT CHANGE UP (ref 80–100)
NRBC # BLD: 0 /100 WBCS — SIGNIFICANT CHANGE UP (ref 0–0)
NRBC # FLD: 0 K/UL — SIGNIFICANT CHANGE UP (ref 0–0)
PLATELET # BLD AUTO: 279 K/UL — SIGNIFICANT CHANGE UP (ref 150–400)
RBC # BLD: 3.29 M/UL — LOW (ref 3.8–5.2)
RBC # FLD: 13.7 % — SIGNIFICANT CHANGE UP (ref 10.3–14.5)
WBC # BLD: 13.66 K/UL — HIGH (ref 3.8–10.5)
WBC # FLD AUTO: 13.66 K/UL — HIGH (ref 3.8–10.5)

## 2024-02-12 RX ORDER — POLYETHYLENE GLYCOL 3350 17 G/17G
17 POWDER, FOR SOLUTION ORAL DAILY
Refills: 0 | Status: DISCONTINUED | OUTPATIENT
Start: 2024-02-12 | End: 2024-02-12

## 2024-02-12 RX ORDER — ACETAMINOPHEN 500 MG
3 TABLET ORAL
Qty: 0 | Refills: 0 | DISCHARGE
Start: 2024-02-12

## 2024-02-12 RX ORDER — SENNA PLUS 8.6 MG/1
1 TABLET ORAL
Refills: 0 | Status: DISCONTINUED | OUTPATIENT
Start: 2024-02-12 | End: 2024-02-12

## 2024-02-12 RX ORDER — IBUPROFEN 200 MG
1 TABLET ORAL
Qty: 0 | Refills: 0 | DISCHARGE
Start: 2024-02-12

## 2024-02-12 RX ADMIN — Medication 600 MILLIGRAM(S): at 03:37

## 2024-02-12 RX ADMIN — Medication 975 MILLIGRAM(S): at 00:41

## 2024-02-12 RX ADMIN — MAGNESIUM HYDROXIDE 30 MILLILITER(S): 400 TABLET, CHEWABLE ORAL at 06:04

## 2024-02-12 RX ADMIN — SIMETHICONE 80 MILLIGRAM(S): 80 TABLET, CHEWABLE ORAL at 12:55

## 2024-02-12 RX ADMIN — Medication 975 MILLIGRAM(S): at 05:59

## 2024-02-12 RX ADMIN — HEPARIN SODIUM 5000 UNIT(S): 5000 INJECTION INTRAVENOUS; SUBCUTANEOUS at 05:59

## 2024-02-12 RX ADMIN — Medication 600 MILLIGRAM(S): at 10:25

## 2024-02-12 RX ADMIN — Medication 600 MILLIGRAM(S): at 09:38

## 2024-02-12 RX ADMIN — Medication 600 MILLIGRAM(S): at 04:07

## 2024-02-12 RX ADMIN — Medication 975 MILLIGRAM(S): at 06:29

## 2024-02-12 RX ADMIN — Medication 975 MILLIGRAM(S): at 12:54

## 2024-02-12 RX ADMIN — Medication 975 MILLIGRAM(S): at 13:30

## 2024-02-12 RX ADMIN — Medication 975 MILLIGRAM(S): at 00:11

## 2024-02-12 NOTE — PROGRESS NOTE ADULT - ASSESSMENT
Patient seen at bedside resting comfortably offers no new complaints. + Ambulation, + void without difficulty, + flatus;  no bm; tolerating regular diet. both breastfeeding and bottle feeding. Denies HA, blurry vision or epigastric pain, CP, SOB, N/V/D,  dizziness, palpitations, worsening vaginal bleeding.    Vital Signs Last 24 Hrs  T(C): 36.6 (12 Feb 2024 06:00), Max: 36.8 (11 Feb 2024 21:54)  T(F): 97.8 (12 Feb 2024 06:00), Max: 98.2 (11 Feb 2024 21:54)  HR: 80 (12 Feb 2024 06:00) (80 - 98)  BP: 102/71 (12 Feb 2024 06:00) (100/60 - 117/71)  BP(mean): --  RR: 20 (12 Feb 2024 06:00) (17 - 20)  SpO2: 100% (12 Feb 2024 06:00) (99% - 100%)    Parameters below as of 12 Feb 2024 06:00  Patient On (Oxygen Delivery Method): room air        Gen: A&O x 3, NAD  Chest: CTABL  Cardiac: S1, S2, RRR  Breast: Soft, nontender, nonengorged  Abdomen: +BS; soft; Nontender, nondistended, Incision C/D/I steri strips in place   Gyn: Minimal lochia  Extremities: Nontender, DTRS 2+, no worsening edema                          10.2   13.66 )-----------( 279      ( 12 Feb 2024 06:10 )             31.0       A/P: 33 yr old F POD #2 s/p r/c/s on 2/10/2024    - Meeting PP milestones   - Pain management as needed  - cont post op care  - OOB and ambulate  - Incision C/D/I (steri strips)  - Incision care discussed   - Senna/Miralax on board for constipation    - Encourage breastfeeding     -d/w dr Magno Young NP

## 2024-02-25 ENCOUNTER — EMERGENCY (EMERGENCY)
Facility: HOSPITAL | Age: 34
LOS: 1 days | Discharge: HOME | End: 2024-02-25
Attending: EMERGENCY MEDICINE | Admitting: EMERGENCY MEDICINE
Payer: COMMERCIAL

## 2024-02-25 VITALS
HEART RATE: 80 BPM | TEMPERATURE: 98 F | OXYGEN SATURATION: 100 % | RESPIRATION RATE: 16 BRPM | SYSTOLIC BLOOD PRESSURE: 115 MMHG | DIASTOLIC BLOOD PRESSURE: 79 MMHG | HEIGHT: 59 IN

## 2024-02-25 DIAGNOSIS — K08.409 PARTIAL LOSS OF TEETH, UNSPECIFIED CAUSE, UNSPECIFIED CLASS: Chronic | ICD-10-CM

## 2024-02-25 DIAGNOSIS — Z98.890 OTHER SPECIFIED POSTPROCEDURAL STATES: Chronic | ICD-10-CM

## 2024-02-25 DIAGNOSIS — Z98.891 HISTORY OF UTERINE SCAR FROM PREVIOUS SURGERY: Chronic | ICD-10-CM

## 2024-02-25 LAB
ALBUMIN SERPL ELPH-MCNC: 3.7 G/DL — SIGNIFICANT CHANGE UP (ref 3.3–5)
ALP SERPL-CCNC: 95 U/L — SIGNIFICANT CHANGE UP (ref 40–120)
ALT FLD-CCNC: 9 U/L — SIGNIFICANT CHANGE UP (ref 4–33)
ANION GAP SERPL CALC-SCNC: 9 MMOL/L — SIGNIFICANT CHANGE UP (ref 7–14)
APTT BLD: 32.9 SEC — SIGNIFICANT CHANGE UP (ref 24.5–35.6)
AST SERPL-CCNC: 18 U/L — SIGNIFICANT CHANGE UP (ref 4–32)
BASOPHILS # BLD AUTO: 0.07 K/UL — SIGNIFICANT CHANGE UP (ref 0–0.2)
BASOPHILS NFR BLD AUTO: 0.9 % — SIGNIFICANT CHANGE UP (ref 0–2)
BILIRUB SERPL-MCNC: <0.2 MG/DL — SIGNIFICANT CHANGE UP (ref 0.2–1.2)
BLD GP AB SCN SERPL QL: NEGATIVE — SIGNIFICANT CHANGE UP
BUN SERPL-MCNC: 22 MG/DL — SIGNIFICANT CHANGE UP (ref 7–23)
CALCIUM SERPL-MCNC: 9.4 MG/DL — SIGNIFICANT CHANGE UP (ref 8.4–10.5)
CHLORIDE SERPL-SCNC: 107 MMOL/L — SIGNIFICANT CHANGE UP (ref 98–107)
CO2 SERPL-SCNC: 25 MMOL/L — SIGNIFICANT CHANGE UP (ref 22–31)
CREAT SERPL-MCNC: 0.56 MG/DL — SIGNIFICANT CHANGE UP (ref 0.5–1.3)
EGFR: 122 ML/MIN/1.73M2 — SIGNIFICANT CHANGE UP
EOSINOPHIL # BLD AUTO: 0.32 K/UL — SIGNIFICANT CHANGE UP (ref 0–0.5)
EOSINOPHIL NFR BLD AUTO: 4.3 % — SIGNIFICANT CHANGE UP (ref 0–6)
GLUCOSE SERPL-MCNC: 104 MG/DL — HIGH (ref 70–99)
HCT VFR BLD CALC: 32 % — LOW (ref 34.5–45)
HGB BLD-MCNC: 10.7 G/DL — LOW (ref 11.5–15.5)
IANC: 4.33 K/UL — SIGNIFICANT CHANGE UP (ref 1.8–7.4)
IMM GRANULOCYTES NFR BLD AUTO: 0.5 % — SIGNIFICANT CHANGE UP (ref 0–0.9)
INR BLD: 0.97 RATIO — SIGNIFICANT CHANGE UP (ref 0.85–1.18)
LYMPHOCYTES # BLD AUTO: 2.27 K/UL — SIGNIFICANT CHANGE UP (ref 1–3.3)
LYMPHOCYTES # BLD AUTO: 30.4 % — SIGNIFICANT CHANGE UP (ref 13–44)
MCHC RBC-ENTMCNC: 31.3 PG — SIGNIFICANT CHANGE UP (ref 27–34)
MCHC RBC-ENTMCNC: 33.4 GM/DL — SIGNIFICANT CHANGE UP (ref 32–36)
MCV RBC AUTO: 93.6 FL — SIGNIFICANT CHANGE UP (ref 80–100)
MONOCYTES # BLD AUTO: 0.44 K/UL — SIGNIFICANT CHANGE UP (ref 0–0.9)
MONOCYTES NFR BLD AUTO: 5.9 % — SIGNIFICANT CHANGE UP (ref 2–14)
NEUTROPHILS # BLD AUTO: 4.33 K/UL — SIGNIFICANT CHANGE UP (ref 1.8–7.4)
NEUTROPHILS NFR BLD AUTO: 58 % — SIGNIFICANT CHANGE UP (ref 43–77)
NRBC # BLD: 0 /100 WBCS — SIGNIFICANT CHANGE UP (ref 0–0)
NRBC # FLD: 0 K/UL — SIGNIFICANT CHANGE UP (ref 0–0)
PLATELET # BLD AUTO: 639 K/UL — HIGH (ref 150–400)
POTASSIUM SERPL-MCNC: 4 MMOL/L — SIGNIFICANT CHANGE UP (ref 3.5–5.3)
POTASSIUM SERPL-SCNC: 4 MMOL/L — SIGNIFICANT CHANGE UP (ref 3.5–5.3)
PROT SERPL-MCNC: 6.4 G/DL — SIGNIFICANT CHANGE UP (ref 6–8.3)
PROTHROM AB SERPL-ACNC: 11 SEC — SIGNIFICANT CHANGE UP (ref 9.5–13)
RBC # BLD: 3.42 M/UL — LOW (ref 3.8–5.2)
RBC # FLD: 13.7 % — SIGNIFICANT CHANGE UP (ref 10.3–14.5)
RH IG SCN BLD-IMP: POSITIVE — SIGNIFICANT CHANGE UP
SODIUM SERPL-SCNC: 141 MMOL/L — SIGNIFICANT CHANGE UP (ref 135–145)
WBC # BLD: 7.47 K/UL — SIGNIFICANT CHANGE UP (ref 3.8–10.5)
WBC # FLD AUTO: 7.47 K/UL — SIGNIFICANT CHANGE UP (ref 3.8–10.5)

## 2024-02-25 PROCEDURE — 99285 EMERGENCY DEPT VISIT HI MDM: CPT

## 2024-02-25 PROCEDURE — 76830 TRANSVAGINAL US NON-OB: CPT | Mod: 26

## 2024-02-25 RX ORDER — SODIUM CHLORIDE 9 MG/ML
1000 INJECTION INTRAMUSCULAR; INTRAVENOUS; SUBCUTANEOUS ONCE
Refills: 0 | Status: COMPLETED | OUTPATIENT
Start: 2024-02-25 | End: 2024-02-25

## 2024-02-25 RX ADMIN — SODIUM CHLORIDE 1000 MILLILITER(S): 9 INJECTION INTRAMUSCULAR; INTRAVENOUS; SUBCUTANEOUS at 21:21

## 2024-02-25 NOTE — CONSULT NOTE ADULT - ASSESSMENT
A/P: 33y  [facility] at 4pm today, GYN consulted for r/o retained products of conception. Pt afebrile and hemodynamically stable. Labs notable for anemia with H/H 10.7/32.0 (stable from Hb 10.2 at time of discharge from admission at time of delivery), no leukocytosis, pt well appearing and without any features of symptomatic anemia at this time. Abdominal exam non-tender, spotting without active vaginal bleeding noted at time of evaluation. TVUS demonstrating dilated heterogeneous 3cm endometrial cavity without evidence of vascular flow. Suspect remaining blood products inside uterus, particularly in setting of uterine atony immediately after delivery, lower suspicion for retained POC at this time.    -No emergent OB/GYN intervention  -Methergine series script sent (0.2mg PO q4h x24h) to promote uterine contraction/expulsion of suspected remaining blood products  -Encourage pt to continue frequent voiding to encourage uterine contraction  -Pt with scheduled f/u appointment with OB , pt encouraged to attend appointment. Recommend further assessment to r/o postpartum depression at outpt f/u given pt very tearful at time of evaluation    D/w Dr. Yoon Jacobs PGY2

## 2024-02-25 NOTE — ED ADULT NURSE REASSESSMENT NOTE - NS ED NURSE REASSESS COMMENT FT1
pt tearful stating " I want to go home, how long is my US going to take I've been here for hours". US contacted, pt pending transport to US and re-educated on plan of care. provided with comfort measures

## 2024-02-25 NOTE — ED PROVIDER NOTE - PATIENT PORTAL LINK FT
You can access the FollowMyHealth Patient Portal offered by [facility] by registering at the following website: [email or web address]. By joining [facility]’s FollowMyHealth portal, you will also be able to view your health information using other applications (apps) compatible with our system.

## 2024-02-25 NOTE — ED ADULT NURSE NOTE - OBJECTIVE STATEMENT
A&ox4.  that recently had a  here at [facility] at 39 weeks 2 days on February 10 6, 2024 by Dr. Staton. Presents with vaginal bleeding.  Patient states that she had no other complications prior to discharge and since she has been home 2 weeks she had vaginal bleeding that was slowing down initially but today she had large-volume dark blood with clots.  She grew concerned and came to the ED for evaluation.  She states that her  scar has been doing well and her  been checking on her daily without any discharge or bleeding.  Patient denies any vaginal discharge as well.   Denies any chest pain, shortness of breath, palpitations, lightheadedness, syncopal episodes.  No fevers, chills, nausea, vomiting, diarrhea.  No urinary symptoms although did have hematuria but cleared up prior to discharge from the ED.  Has been on chronic pain medication since discharge so unsure if she has had any fevers otherwise.  Respirations even and unlabored. 20 gauge IV placed in left AC. Labs obtained. Medications given as per current care plan. Awaiting US. Safety precautions in place.

## 2024-02-25 NOTE — ED ADULT NURSE NOTE - NSFALLUNIVINTERV_ED_ALL_ED
Bed/Stretcher in lowest position, wheels locked, appropriate side rails in place/Call bell, personal items and telephone in reach/Instruct patient to call for assistance before getting out of bed/chair/stretcher/Non-slip footwear applied when patient is off stretcher/[place] to call system/Physically safe environment - no spills, clutter or unnecessary equipment/Purposeful proactive rounding/Room/bathroom lighting operational, light cord in reach

## 2024-02-25 NOTE — ED PROVIDER NOTE - CLINICAL SUMMARY MEDICAL DECISION MAKING FREE TEXT BOX
33-year-old female  that recently had a  here at [facility] at 39 weeks 2 days on February 10 6, 2024 by Dr. Staton that presents with vaginal bleeding.  Concern for retained products at this time.   scar appears normal and intact.  Will obtained labs and transvaginal ultrasound to rule out retained products and likely call OB for recommendations.

## 2024-02-25 NOTE — ED PROVIDER NOTE - NSFOLLOWUPINSTRUCTIONS_ED_ALL_ED_FT
You were seen and evaluated in the emergency room for vaginal bleeding after  2 weeks ago.    We obtained labs and imaging including transvaginal ultrasound.    You were evaluated by the OB/GYN's here and they are recommending you take a medication called Methergine which helps with abnormal uterine bleeding.  Please take this as prescribed.    Please follow-up with your OB/GYN as previously scheduled for your checkup after your .    If your symptoms worsen and you cannot see your OB/GYN please come to back to the emergency room for further treatment and evaluation.

## 2024-02-25 NOTE — ED PROVIDER NOTE - OBJECTIVE STATEMENT
33-year-old female  that recently had a  here at [facility] at 39 weeks 2 days on February 10 6, 2024 by Dr. Staton that presents with vaginal bleeding.  Patient states that she had no other complications prior to discharge and since she has been home 2 weeks she had vaginal bleeding that was slowing down initially but today she had large-volume dark blood with clots.  She grew concerned and came to the ED for evaluation.  Denies any chest pain, shortness of breath, palpitations, lightheadedness, syncopal episodes.  She states that her  scar has been doing well and her  been checking on her daily without any discharge or bleeding.  Patient denies any vaginal discharge as well.  No fevers, chills, nausea, vomiting, diarrhea.  No urinary symptoms although did have hematuria but cleared up prior to discharge from the ED.  Has been on chronic pain medication since discharge so unsure if she has had any fevers otherwise.

## 2024-02-25 NOTE — ED PROVIDER NOTE - PROGRESS NOTE DETAILS
VASQUEZ: pt labs are relatively unremarkable. H/H stable from prior PEDRO: Patient's H/H is stable from prior to discharge at 10.7/32.0.  Labs otherwise unremarkable.  Patient is be positive blood type.  Ultrasound is read by radiology as "the endometrial cavity is markedly dilated and heterogeneous in appearance.  No vascular flow seen.  No definitive evidence of retained products of conception.".  At this time will consult OB/GYN for further recommendations and evaluation of patient's vaginal bleeding.  Patient is made aware of her findings. PEDRO: Patient is seen and evaluated by OB/GYN.  Their recommendations are appreciated.  See their note for details.  They are recommending Methergine and follow-up with her OB/GYN.  Patient has an appointment tomorrow.  Advised to follow-up with OB/GYN as previously scheduled.  Return precaution explained understood.  Labs and results were provided to patient to follow-up with her PMD/OB/GYN.

## 2024-02-25 NOTE — CONSULT NOTE ADULT - SUBJECTIVE AND OBJECTIVE BOX
Gyn Consult Note  ALISIA STONE  33y  Female [id number]    HPI:      Name of GYN Physician:     OBHx:    GYNHx: Denies fibroids, cysts, endometriosis, STI's, Abnormal pap smears     PAST MEDICAL & SURGICAL HISTORY:  GERD (gastroesophageal reflux disease)      Missed       H/O wisdom tooth extraction      H/O dilation and curettage  2020      H/O breast biopsy  2021  findings benign      H/O  section          Meds:     Allergies    No Known Allergies    Intolerances        FAMILY HISTORY:  No pertinent family history in first degree relatives        Social:     Vital Signs Last 24 Hrs  T(C): 36.7 (2024 19:34), Max: 36.7 (2024 19:34)  T(F): 98.1 (2024 19:34), Max: 98.1 (2024 19:34)  HR: 80 (2024 19:34) (80 - 80)  BP: 115/79 (2024 19:34) (115/79 - 115/79)  BP(mean): --  RR: 16 (2024 19:34) (16 - 16)  SpO2: 100% (2024 19:34) (100% - 100%)    Parameters below as of 2024 19:34  Patient On (Oxygen Delivery Method): room air        Physical Exam:   General: sitting comfortably in bed, NAD   CV: RRR  Lungs: CTAB  Back: No CVA tenderness  Abd: Soft, non-tender, non-distended.  Bowel sounds present.    : No bleeding on pad. External labia wnl. Uterus wnl, nontender. Adnexa non palpable b/l. No CMT. Cervix closed.   Speculum Exam: No active bleeding from os.  Physiologic discharge.    Ext: non-tender b/l, no edema     LABS:                              10.7   7.47  )-----------( 639      ( 2024 21:00 )             32.0     02-    141  |  107  |  22  ----------------------------<  104<H>  4.0   |  25  |  0.56    Ca    9.4      2024 21:00    TPro  6.4  /  Alb  3.7  /  TBili  <0.2  /  DBili  x   /  AST  18  /  ALT  9   /  AlkPhos  95      PT/INR - ( 2024 21:00 )   PT: 11.0 sec;   INR: 0.97 ratio         PTT - ( 2024 21:00 )  PTT:32.9 sec  Urinalysis Basic - ( 2024 21:00 )    Color: x / Appearance: x / SG: x / pH: x  Gluc: 104 mg/dL / Ketone: x  / Bili: x / Urobili: x   Blood: x / Protein: x / Nitrite: x   Leuk Esterase: x / RBC: x / WBC x   Sq Epi: x / Non Sq Epi: x / Bacteria: x        RADIOLOGY & ADDITIONAL STUDIES:  < from: US Transvaginal (24 @ 22:54) >    ACC: [id number] EXAM:  US TRANSVAGINAL   ORDERED BY: ROSAS VASQUEZ     PROCEDURE DATE:  2024          INTERPRETATION:  CLINICAL INFORMATION: Status post  now with   vaginal bleeding evaluate for retained products of conception    COMPARISON: None available.    TECHNIQUE:  Endovaginal and transabdominal pelvic sonogram. Color and Spectral   Doppler was performed.    FINDINGS:  Uterus: 15.5 cm x 6.0 cm x 8.7 cm. Within normal limits.  Endometrium: 30 mm. The endometrial cavity is markedly dilated and   heterogeneous in appearance. No vascular flow is seen. No definite   evidence for retained products of conception.    Right ovary: Knee right ovary could not be visualized.  Left ovary: 2.0 cm x 4.1 cm x 1.0 cm. Within normal limits. Normal   arterial and venous waveforms.    Fluid: None.    IMPRESSION:  The endometrial cavity is markedly dilated and heterogeneous in   appearance. No vascular flow is seen. No definite evidence for retained   products of conception.        < end of copied text >   Gyn Consult Note  ALISIA STONE  33y  Female [id number]    HPI: 33y  POD#15 from rLTCS p/w episode of heavy vaginal bleeding at 4pm today. Pt reports the bleeding since delivery has been light but today she passed a handful of quarter-sized clots and saturated one pad within one hour. Pt reports the bleeding since has been light. Pt reports she is unsure how many pads in total she has used today because she frequently changes pad for hygiene purposes. Reports abdominal pain is well controlled with Tylenol and Motrin. Pt denies fevers, chills, n/v, chest pain, SOB, dysuria, dizziness, syncope. Pt reports she is breastfeeding.    Name of GYN Physician: [facility]    OBHx: 2/10/24- rLTCS, breech, c/b PPH +366, s/p Methergine series, FT  - pLTCS  breech, FT and uncomplicated per pt  MAB x1 with D+C  GYNHx: Denies fibroids, cysts, endometriosis, STI's, Abnormal pap smears   PMH: GERD  PSH: C/S x2, D+C x1, L breast biopsy (benign)  Meds: PNV, Tylenol/Motrin PRN  NKDA  Social: pt reports feeling safe at home with  and children, but reports feeling very overwhelmed about breastfeeding as she had difficulties with her last child    Vital Signs Last 24 Hrs  T(C): 36.7 (2024 19:34), Max: 36.7 (2024 19:34)  T(F): 98.1 (2024 19:34), Max: 98.1 (2024 19:34)  HR: 80 (2024 19:34) (80 - 80)  BP: 115/79 (2024 19:34) (115/79 - 115/79)  BP(mean): --  RR: 16 (2024 19:34) (16 - 16)  SpO2: 100% (2024 19:34) (100% - 100%)    Parameters below as of 2024 19:34  Patient On (Oxygen Delivery Method): room air      Physical Exam:   General: NAD, tearful  CV: clinically well perfused  Lungs: respiring comfortably on room air  Back: No CVA tenderness  Abd: Soft, non-tender, non-distended.  Bowel sounds present.    : Scant spotting on pad (changed 2h ago per pt). External labia wnl. Uterus wnl, nontender. Adnexa non palpable b/l. No CMT. Cervix closed.   Speculum Exam: No active bleeding from os. 2cc dark red spotting in vaginal vault  Ext: non-tender b/l, no edema     LABS:                              10.7   7.47  )-----------( 639      ( 2024 21:00 )             32.0         141  |  107  |  22  ----------------------------<  104<H>  4.0   |  25  |  0.56    Ca    9.4      2024 21:00    TPro  6.4  /  Alb  3.7  /  TBili  <0.2  /  DBili  x   /  AST  18  /  ALT  9   /  AlkPhos  95      PT/INR - ( 2024 21:00 )   PT: 11.0 sec;   INR: 0.97 ratio         PTT - ( 2024 21:00 )  PTT:32.9 sec  Urinalysis Basic - ( 2024 21:00 )    Color: x / Appearance: x / SG: x / pH: x  Gluc: 104 mg/dL / Ketone: x  / Bili: x / Urobili: x   Blood: x / Protein: x / Nitrite: x   Leuk Esterase: x / RBC: x / WBC x   Sq Epi: x / Non Sq Epi: x / Bacteria: x        RADIOLOGY & ADDITIONAL STUDIES:  < from: US Transvaginal (24 @ 22:54) >    ACC: [id number] EXAM:  US TRANSVAGINAL   ORDERED BY: ROSAS VASQUEZ     PROCEDURE DATE:  2024          INTERPRETATION:  CLINICAL INFORMATION: Status post  now with   vaginal bleeding evaluate for retained products of conception    COMPARISON: None available.    TECHNIQUE:  Endovaginal and transabdominal pelvic sonogram. Color and Spectral   Doppler was performed.    FINDINGS:  Uterus: 15.5 cm x 6.0 cm x 8.7 cm. Within normal limits.  Endometrium: 30 mm. The endometrial cavity is markedly dilated and   heterogeneous in appearance. No vascular flow is seen. No definite   evidence for retained products of conception.    Right ovary: Knee right ovary could not be visualized.  Left ovary: 2.0 cm x 4.1 cm x 1.0 cm. Within normal limits. Normal   arterial and venous waveforms.    Fluid: None.    IMPRESSION:  The endometrial cavity is markedly dilated and heterogeneous in   appearance. No vascular flow is seen. No definite evidence for retained   products of conception.        < end of copied text >

## 2024-02-25 NOTE — ED ADULT NURSE NOTE - TEMPLATE
People: As much as possible, you should stay in a specific room and away from other people in your home. Also, you should use a separate bathroom, if available. Animals: You should restrict contact with pets and other animals while you are sick with COVID-19, just like you would around other people. Although there have not been reports of pets or other animals becoming sick with COVID-19, it is still recommended that people sick with COVID-19 limit contact with animals until more information is known about the virus. When possible, have another member of your household care for your animals while you are sick. If you are sick with COVID-19, avoid contact with your pet, including petting, snuggling, being kissed or licked, and sharing food. If you must care for your pet or be around animals while you are sick, wash your hands before and after you interact with pets and wear a facemask. Call ahead before visiting your doctor  If you have a medical appointment, call the healthcare provider and tell them that you have or may have COVID-19. This will help the healthcare providers office take steps to keep other people from getting infected or exposed. Wear a facemask  You should wear a facemask when you are around other people (e.g., sharing a room or vehicle) or pets and before you enter a healthcare providers office. If you are not able to wear a facemask (for example, because it causes trouble breathing), then people who live with you should not stay in the same room with you, or they should wear a facemask if they enter your room. Cover your coughs and sneezes  Cover your mouth and nose with a tissue when you cough or sneeze. Throw used tissues in a lined trash can. Immediately wash your hands with soap and water for at least 20 seconds or, if soap and water are not available, clean your hands with an alcohol-based hand  that contains at least 60% alcohol.   Clean your hands often Wash your hands often with soap and water for at least 20 seconds, especially after blowing your nose, coughing, or sneezing; going to the bathroom; and before eating or preparing food. If soap and water are not readily available, use an alcohol-based hand  with at least 60% alcohol, covering all surfaces of your hands and rubbing them together until they feel dry. Soap and water are the best option if hands are visibly dirty. Avoid touching your eyes, nose, and mouth with unwashed hands. Avoid sharing personal household items  You should not share dishes, drinking glasses, cups, eating utensils, towels, or bedding with other people or pets in your home. After using these items, they should be washed thoroughly with soap and water. Clean all high-touch surfaces everyday  High touch surfaces include counters, tabletops, doorknobs, bathroom fixtures, toilets, phones, keyboards, tablets, and bedside tables. Also, clean any surfaces that may have blood, stool, or body fluids on them. Use a household cleaning spray or wipe, according to the label instructions. Labels contain instructions for safe and effective use of the cleaning product including precautions you should take when applying the product, such as wearing gloves and making sure you have good ventilation during use of the product.   Monitor your symptoms Seek prompt medical attention if your illness is worsening (e.g., difficulty breathing). Before seeking care, call your healthcare provider and tell them that you have, or are being evaluated for, COVID-19. Put on a facemask before you enter the facility. These steps will help the healthcare providers office to keep other people in the office or waiting room from getting infected or exposed. Ask your healthcare provider to call the local or state health department. Persons who are placed under active monitoring or facilitated self-monitoring should follow instructions provided by their local health department or occupational health professionals, as appropriate. When working with your local health department check their available hours. If you have a medical emergency and need to call 911, notify the dispatch personnel that you have, or are being evaluated for COVID-19. If possible, put on a facemask before emergency medical services arrive. Discontinuing home isolation  Patients with confirmed COVID-19 should remain under home isolation precautions until the risk of secondary transmission to others is thought to be low. The decision to discontinue home isolation precautions should be made on a case-by-case basis, in consultation with healthcare providers and state and local health departments. OB/GYN

## 2024-02-25 NOTE — ED PROVIDER NOTE - PHYSICAL EXAMINATION
Tearful but consolable.  Nonacute distress otherwise breathing normally.   scar appears well and intact without any discharge or bleeding and no fluctuance.  No surrounding erythema and no crepitus.  Pelvic exam deferred.  Abdomen soft nontender otherwise. Tearful but consolable.  Nonacute distress otherwise breathing normally.   scar appears well and intact without any discharge or bleeding and no fluctuance.  No surrounding erythema and no crepitus.  Pelvic exam refused at this time. Abdomen soft nontender otherwise.

## 2024-04-18 NOTE — ED ADULT TRIAGE NOTE - CHIEF COMPLAINT QUOTE
Patient started to have urinary symptoms that started over the weekend. Patient started to have some burning today and some weakness.         
states" I am 2 weeks post partum C section on February 10 and I am having heavy bleeding to day "

## 2025-01-09 NOTE — ED ADULT NURSE NOTE - NSSEPSISSUSPECTED_ED_A_ED
Chart reviewed. Patient encountered seated in waiting area with NAD. Patient underwent occupational therapy pre-operative consultation to determine current functional limitations in order to recommend appropriate DME & educate patient on post operative rehab services. No

## 2025-04-08 ENCOUNTER — APPOINTMENT (OUTPATIENT)
Dept: ORTHOPEDIC SURGERY | Facility: CLINIC | Age: 35
End: 2025-04-08
Payer: COMMERCIAL

## 2025-04-08 DIAGNOSIS — G56.21 LESION OF ULNAR NERVE, RIGHT UPPER LIMB: ICD-10-CM

## 2025-04-08 PROCEDURE — 99203 OFFICE O/P NEW LOW 30 MIN: CPT

## 2025-04-08 PROCEDURE — 73080 X-RAY EXAM OF ELBOW: CPT | Mod: RT

## 2025-04-17 NOTE — OB RN PREOPERATIVE CHECKLIST - STERILIZATION AFFIRMATION
Patient identification verified with 2 identifiers.    Location: [facility] - [id number]. [place], [place] [address] [phone]     Referring Physician: DR. FORTUNE  Enrollment/ Re-enrollment date: 3/27/25   INR Goal: 2.0-3.0  INR monitoring is per [facility] protocol.  Anticoagulation Medication: warfarin  Indication: Pulmonary Embolism (PE)    Subjective   Bleeding signs/symptoms: No    Bruising: No   Major bleeding event: No  Thrombosis signs/symptoms: No  Thromboembolic event: No  Missed doses: No  Extra doses: No  Medication changes: Yes  PT DID NOT START CYMBALTA YET.  SHE WILL START TODAY.  Dietary changes: No  Change in health: No  Change in activity: No  Alcohol: No  Other concerns: No    Upcoming Procedures:  Does the Patient Have any upcoming procedures that require interruption in anticoagulation therapy? no  Does the patient require bridging? no      Anticoagulation Summary  As of 4/17/2025      INR goal:  2.0-3.0   TTR:  7.4% (1.1 wk)   INR used for dosing:  --   Weekly warfarin total:  27.5 mg               Assessment/Plan   Therapeutic     1. New dose:  WILL DECREASE DOSE SLIGHTLY TO REFLECT CLOSER TO THE DOSE SHE TOOK IN THE PAST 7 DAYS.  PT STATES THERE WAS NO REASON FOR 4.4 INR LAST WEEK.     ALSO, CYMBALTA CAN CAUSE INR ELEVATION.  2. Next INR:  4 DAYS      Education provided to patient during the visit:  Patient instructed to call in interim with questions, concerns and changes.   Patient educated on interactions between medications and warfarin.   Patient educated on dietary consistency in vitamin k consumption.   Patient educated on signs of bleeding/clotting.         n/a

## 2025-05-02 ENCOUNTER — APPOINTMENT (OUTPATIENT)
Facility: CLINIC | Age: 35
End: 2025-05-02
Payer: COMMERCIAL

## 2025-05-02 PROCEDURE — 95886 MUSC TEST DONE W/N TEST COMP: CPT

## 2025-05-02 PROCEDURE — 95910 NRV CNDJ TEST 7-8 STUDIES: CPT

## 2025-05-05 ENCOUNTER — APPOINTMENT (OUTPATIENT)
Dept: ORTHOPEDIC SURGERY | Facility: CLINIC | Age: 35
End: 2025-05-05
Payer: COMMERCIAL

## 2025-05-05 VITALS — BODY MASS INDEX: 24.19 KG/M2 | HEIGHT: 59 IN | WEIGHT: 120 LBS

## 2025-05-05 DIAGNOSIS — G56.21 LESION OF ULNAR NERVE, RIGHT UPPER LIMB: ICD-10-CM

## 2025-05-05 DIAGNOSIS — G56.01 CARPAL TUNNEL SYNDROME, RIGHT UPPER LIMB: ICD-10-CM

## 2025-05-05 PROCEDURE — 99214 OFFICE O/P EST MOD 30 MIN: CPT

## 2025-05-12 ENCOUNTER — TRANSCRIPTION ENCOUNTER (OUTPATIENT)
Age: 35
End: 2025-05-12

## 2025-05-14 ENCOUNTER — TRANSCRIPTION ENCOUNTER (OUTPATIENT)
Age: 35
End: 2025-05-14
